# Patient Record
Sex: FEMALE | Race: WHITE | NOT HISPANIC OR LATINO | Employment: FULL TIME | URBAN - METROPOLITAN AREA
[De-identification: names, ages, dates, MRNs, and addresses within clinical notes are randomized per-mention and may not be internally consistent; named-entity substitution may affect disease eponyms.]

---

## 2017-01-23 RX ORDER — CITALOPRAM 40 MG/1
TABLET ORAL
Qty: 90 TABLET | Refills: 0 | Status: SHIPPED | OUTPATIENT
Start: 2017-01-23 | End: 2017-10-24 | Stop reason: SDUPTHER

## 2017-03-10 ENCOUNTER — OFFICE VISIT (OUTPATIENT)
Dept: OBSTETRICS AND GYNECOLOGY | Facility: CLINIC | Age: 57
End: 2017-03-10

## 2017-03-10 VITALS
BODY MASS INDEX: 26.4 KG/M2 | SYSTOLIC BLOOD PRESSURE: 127 MMHG | DIASTOLIC BLOOD PRESSURE: 82 MMHG | HEIGHT: 63 IN | HEART RATE: 65 BPM | WEIGHT: 149 LBS

## 2017-03-10 DIAGNOSIS — N76.0 ACUTE VAGINITIS: ICD-10-CM

## 2017-03-10 DIAGNOSIS — Z12.11 COLON CANCER SCREENING: ICD-10-CM

## 2017-03-10 DIAGNOSIS — Z01.419 ENCOUNTER FOR GYNECOLOGICAL EXAMINATION WITHOUT ABNORMAL FINDING: Primary | ICD-10-CM

## 2017-03-10 LAB — HEMOCCULT STL QL IA: NEGATIVE

## 2017-03-10 PROCEDURE — 99396 PREV VISIT EST AGE 40-64: CPT | Performed by: OBSTETRICS & GYNECOLOGY

## 2017-03-10 PROCEDURE — 82274 ASSAY TEST FOR BLOOD FECAL: CPT | Performed by: OBSTETRICS & GYNECOLOGY

## 2017-03-10 NOTE — PROGRESS NOTES
"Subjective   Estephania Gimenez is a 56 y.o. female annual exam. Had spotting and discharge 1 day last month after a bike ride.    History of Present Illness    Patient is a 56 y.o. for annual exam. Patient with spotting about a month ago after a bike ride. Patient without further bleeding. Patient did have some discharge as well.     Health Maintenance   Topic Date Due   • TDAP/TD VACCINES (1 - Tdap) 07/11/1977   • HEPATITIS C SCREENING  02/25/2016   • INFLUENZA VACCINE  08/01/2016   • DXA SCAN  03/10/2017   • LIPID PANEL  09/09/2017   • MAMMOGRAM  09/01/2018   • PAP SMEAR  08/01/2019   • COLONOSCOPY  03/10/2024       The following portions of the patient's history were reviewed and updated as appropriate: allergies, current medications, past family history, past medical history, past social history, past surgical history and problem list.    Review of Systems   Genitourinary: Positive for vaginal bleeding.   All other systems reviewed and are negative.      Vitals:    03/10/17 1355   BP: 127/82   Pulse: 65   Weight: 149 lb (67.6 kg)   Height: 63\" (160 cm)       Objective   Physical Exam   Constitutional: She is oriented to person, place, and time. She appears well-developed and well-nourished.   HENT:   Head: Normocephalic and atraumatic.   Eyes: Conjunctivae and EOM are normal.   Neck: Normal range of motion. Neck supple. No thyromegaly present.   Cardiovascular: Normal rate, regular rhythm and normal heart sounds.    Pulmonary/Chest: Effort normal and breath sounds normal. Right breast exhibits no mass, no nipple discharge and no tenderness. Left breast exhibits no mass, no nipple discharge and no tenderness.   Abdominal: Soft. Bowel sounds are normal. There is no hepatosplenomegaly. There is no tenderness. No hernia.   Genitourinary: Rectum normal, vagina normal and uterus normal. Rectal exam shows no external hemorrhoid. There is no rash, tenderness or lesion on the right labia. There is no rash, tenderness or " lesion on the left labia. Uterus is not enlarged and not tender. Cervix exhibits no discharge. Right adnexum displays no mass and no tenderness. Left adnexum displays no mass and no tenderness. No tenderness in the vagina. No vaginal discharge found.   Musculoskeletal: Normal range of motion. She exhibits no edema.   Lymphadenopathy:        Right: No inguinal adenopathy present.        Left: No inguinal adenopathy present.   Neurological: She is alert and oriented to person, place, and time.   Skin: Skin is warm and dry. No rash noted.   Psychiatric: She has a normal mood and affect.   Vitals reviewed.        Assessment/Plan   Estephania was seen today for gynecologic exam.    Diagnoses and all orders for this visit:    Encounter for gynecological examination without abnormal finding    Acute vaginitis  -     Nuab VG+    check ultrasound for postmenopausal bleeding.             Return in about 1 year (around 3/10/2018).

## 2017-03-14 LAB
A VAGINAE DNA VAG QL NAA+PROBE: NORMAL SCORE
BVAB2 DNA VAG QL NAA+PROBE: NORMAL SCORE
C ALBICANS DNA VAG QL NAA+PROBE: NEGATIVE
C GLABRATA DNA VAG QL NAA+PROBE: NEGATIVE
C TRACH RRNA SPEC QL NAA+PROBE: NEGATIVE
MEGA1 DNA VAG QL NAA+PROBE: NORMAL SCORE
N GONORRHOEA RRNA SPEC QL NAA+PROBE: NEGATIVE
T VAGINALIS RRNA SPEC QL NAA+PROBE: NEGATIVE

## 2017-03-22 DIAGNOSIS — E78.5 HYPERLIPIDEMIA, UNSPECIFIED HYPERLIPIDEMIA TYPE: ICD-10-CM

## 2017-03-22 DIAGNOSIS — R73.01 ELEVATED FASTING GLUCOSE: ICD-10-CM

## 2017-03-22 DIAGNOSIS — Z11.59 NEED FOR HEPATITIS C SCREENING TEST: Primary | ICD-10-CM

## 2017-03-24 ENCOUNTER — PROCEDURE VISIT (OUTPATIENT)
Dept: OBSTETRICS AND GYNECOLOGY | Facility: CLINIC | Age: 57
End: 2017-03-24

## 2017-03-24 DIAGNOSIS — N95.0 PMB (POSTMENOPAUSAL BLEEDING): Primary | ICD-10-CM

## 2017-03-24 DIAGNOSIS — D25.9 UTERINE LEIOMYOMA, UNSPECIFIED LOCATION: ICD-10-CM

## 2017-03-24 PROCEDURE — 76830 TRANSVAGINAL US NON-OB: CPT | Performed by: OBSTETRICS & GYNECOLOGY

## 2017-04-12 LAB
ALBUMIN SERPL-MCNC: 4.4 G/DL (ref 3.5–5.2)
ALBUMIN/GLOB SERPL: 1.4 G/DL
ALP SERPL-CCNC: 80 U/L (ref 39–117)
ALT SERPL-CCNC: 19 U/L (ref 1–33)
AST SERPL-CCNC: 19 U/L (ref 1–32)
BASOPHILS # BLD AUTO: 0.02 10*3/MM3 (ref 0–0.2)
BASOPHILS NFR BLD AUTO: 0.6 % (ref 0–1.5)
BILIRUB SERPL-MCNC: 0.3 MG/DL (ref 0.1–1.2)
BUN SERPL-MCNC: 17 MG/DL (ref 6–20)
BUN/CREAT SERPL: 24.3 (ref 7–25)
CALCIUM SERPL-MCNC: 9.9 MG/DL (ref 8.6–10.5)
CHLORIDE SERPL-SCNC: 102 MMOL/L (ref 98–107)
CHOLEST SERPL-MCNC: 166 MG/DL (ref 100–199)
CO2 SERPL-SCNC: 28.1 MMOL/L (ref 22–29)
CREAT SERPL-MCNC: 0.7 MG/DL (ref 0.57–1)
EOSINOPHIL # BLD AUTO: 0.11 10*3/MM3 (ref 0–0.7)
EOSINOPHIL NFR BLD AUTO: 3.2 % (ref 0.3–6.2)
ERYTHROCYTE [DISTWIDTH] IN BLOOD BY AUTOMATED COUNT: 13.1 % (ref 11.7–13)
GLOBULIN SER CALC-MCNC: 3.2 GM/DL
GLUCOSE SERPL-MCNC: 94 MG/DL (ref 65–99)
HBA1C MFR BLD: 5.6 % (ref 4.8–5.6)
HCT VFR BLD AUTO: 39.6 % (ref 35.6–45.5)
HDL SERPL-SCNC: 39.5 UMOL/L
HDLC SERPL-MCNC: 48 MG/DL
HGB BLD-MCNC: 12.8 G/DL (ref 11.9–15.5)
IMM GRANULOCYTES # BLD: 0 10*3/MM3 (ref 0–0.03)
IMM GRANULOCYTES NFR BLD: 0 % (ref 0–0.5)
LDL SERPL QN: 21 NM
LDL SERPL-SCNC: 1230 NMOL/L
LDL SMALL SERPL-SCNC: 705 NMOL/L
LDLC SERPL CALC-MCNC: 91 MG/DL (ref 0–99)
LYMPHOCYTES # BLD AUTO: 1.53 10*3/MM3 (ref 0.9–4.8)
LYMPHOCYTES NFR BLD AUTO: 44.7 % (ref 19.6–45.3)
MCH RBC QN AUTO: 31.5 PG (ref 26.9–32)
MCHC RBC AUTO-ENTMCNC: 32.3 G/DL (ref 32.4–36.3)
MCV RBC AUTO: 97.5 FL (ref 80.5–98.2)
MONOCYTES # BLD AUTO: 0.31 10*3/MM3 (ref 0.2–1.2)
MONOCYTES NFR BLD AUTO: 9.1 % (ref 5–12)
NEUTROPHILS # BLD AUTO: 1.45 10*3/MM3 (ref 1.9–8.1)
NEUTROPHILS NFR BLD AUTO: 42.4 % (ref 42.7–76)
PLATELET # BLD AUTO: 286 10*3/MM3 (ref 140–500)
POTASSIUM SERPL-SCNC: 4.5 MMOL/L (ref 3.5–5.2)
PROT SERPL-MCNC: 7.6 G/DL (ref 6–8.5)
RBC # BLD AUTO: 4.06 10*6/MM3 (ref 3.9–5.2)
SODIUM SERPL-SCNC: 143 MMOL/L (ref 136–145)
TRIGL SERPL-MCNC: 133 MG/DL (ref 0–149)
WBC # BLD AUTO: 3.42 10*3/MM3 (ref 4.5–10.7)

## 2017-04-18 ENCOUNTER — OFFICE VISIT (OUTPATIENT)
Dept: FAMILY MEDICINE CLINIC | Facility: CLINIC | Age: 57
End: 2017-04-18

## 2017-04-18 VITALS
DIASTOLIC BLOOD PRESSURE: 70 MMHG | OXYGEN SATURATION: 98 % | SYSTOLIC BLOOD PRESSURE: 120 MMHG | TEMPERATURE: 97.8 F | BODY MASS INDEX: 27.11 KG/M2 | HEIGHT: 63 IN | HEART RATE: 64 BPM | WEIGHT: 153 LBS

## 2017-04-18 DIAGNOSIS — J30.89 ENVIRONMENTAL AND SEASONAL ALLERGIES: Primary | ICD-10-CM

## 2017-04-18 DIAGNOSIS — E78.5 HYPERLIPIDEMIA, UNSPECIFIED HYPERLIPIDEMIA TYPE: ICD-10-CM

## 2017-04-18 DIAGNOSIS — F41.9 ANXIETY: ICD-10-CM

## 2017-04-18 PROCEDURE — 99213 OFFICE O/P EST LOW 20 MIN: CPT | Performed by: INTERNAL MEDICINE

## 2017-04-18 RX ORDER — MONTELUKAST SODIUM 10 MG/1
10 TABLET ORAL NIGHTLY
Qty: 30 TABLET | Refills: 5 | Status: SHIPPED | OUTPATIENT
Start: 2017-04-18 | End: 2017-11-08

## 2017-04-18 NOTE — PROGRESS NOTES
Subjective   Estephania Gimenez is a 56 y.o. female. Patient is here today for follow-up on her hyperlipidemia, anxiety.  She generally is been feeling fine but has been having a lot of problem with Springtime allergies.  Normally they last just for several weeks to a month and then resolve they've been especially bad this year and she's had a lot of eye tearing and symptoms.  She has been using over-the-counter any histamine's but is not added and in no spray.    Chief Complaint   Patient presents with   • Hyperlipidemia     FOLLOW UP LABS          Vitals:    04/18/17 1033   BP: 120/70   Pulse: 64   Temp: 97.8 °F (36.6 °C)   SpO2: 98%     The following portions of the patient's history were reviewed and updated as appropriate: allergies, current medications, past family history, past medical history, past social history, past surgical history and problem list.    Past Medical History:   Diagnosis Date   • Cellulitis       No Known Allergies   Social History     Social History   • Marital status:      Spouse name: N/A   • Number of children: N/A   • Years of education: N/A     Occupational History   • Not on file.     Social History Main Topics   • Smoking status: Former Smoker   • Smokeless tobacco: Never Used   • Alcohol use Yes   • Drug use: No   • Sexual activity: Yes     Partners: Male     Other Topics Concern   • Not on file     Social History Narrative        Current Outpatient Prescriptions:   •  aspirin 81 MG tablet, Take  by mouth., Disp: , Rfl:   •  Calcium Citrate-Vitamin D (CALCIUM CITRATE + PO), Take  by mouth., Disp: , Rfl:   •  citalopram (CeleXA) 40 MG tablet, TAKE 1 TABLET BY MOUTH DAILY, Disp: 90 tablet, Rfl: 0  •  MULTIPLE VITAMINS-CALCIUM PO, Take  by mouth., Disp: , Rfl:   •  pravastatin (PRAVACHOL) 40 MG tablet, TAKE 1 TABLET BY MOUTH EVERY NIGHT AT BEDTIME, Disp: 90 tablet, Rfl: 1  •  montelukast (SINGULAIR) 10 MG tablet, Take 1 tablet by mouth Every Night., Disp: 30 tablet, Rfl: 5      Objective     History of Present Illness     Review of Systems   Constitutional: Negative.    HENT: Positive for congestion, rhinorrhea and sneezing.    Eyes: Positive for itching.   Respiratory: Negative.    Cardiovascular: Negative.    Gastrointestinal: Negative.    Genitourinary: Negative.    Musculoskeletal: Negative.    Neurological: Negative.    Psychiatric/Behavioral: Negative.        Physical Exam   Constitutional: She appears well-developed and well-nourished.   Pleasant, cooperative and in no distress with blood pressure under good control at 120/80   HENT:   Head: Normocephalic and atraumatic.   Eyes: Conjunctivae are normal.   Neck: Normal range of motion. Neck supple.   Cardiovascular: Normal rate, regular rhythm and normal heart sounds.    Pulmonary/Chest: Effort normal and breath sounds normal. No respiratory distress. She has no wheezes. She has no rales.   Musculoskeletal: Normal range of motion. She exhibits no edema.   Neurological: She is alert.   Skin: Skin is warm and dry.   Psychiatric: She has a normal mood and affect. Her behavior is normal.   Nursing note and vitals reviewed.      ASSESSMENT  overall the patient seems stable.  Blood pressure and heart and lungs are fine.  She's having symptoms of springtime allergies.  Her CBC is generally normal aside from a slightly low white cell count.  CMP was completely normal and hemoglobin A1c was normal as well.  Her lipid panel is stable with the LDL cholesterol in the moderate range.     Problem List Items Addressed This Visit        Cardiovascular and Mediastinum    Hyperlipidemia       Other    Anxiety    Environmental and seasonal allergies - Primary          PLAN  the patient will continue with an over-the-counter any histamine.  She can also add in one of the steroids nasal sprays if she wishes.  I'm going to get her some Singulair 10 mg at night to try and see if that helps.  She'll continue her other medicines and a plan on rechecking  her in 6 months with a CMP, NMR lipid panel, hemoglobin A1c and TSH because of fatigue      There are no Patient Instructions on file for this visit.  Return in about 6 months (around 10/18/2017) for with labs.

## 2017-05-15 RX ORDER — CITALOPRAM 40 MG/1
TABLET ORAL
Qty: 90 TABLET | Refills: 0 | Status: SHIPPED | OUTPATIENT
Start: 2017-05-15 | End: 2017-11-08 | Stop reason: SDUPTHER

## 2017-07-18 RX ORDER — PRAVASTATIN SODIUM 40 MG
TABLET ORAL
Qty: 90 TABLET | Refills: 3 | Status: SHIPPED | OUTPATIENT
Start: 2017-07-18 | End: 2017-10-24 | Stop reason: SDUPTHER

## 2017-08-14 RX ORDER — CITALOPRAM 40 MG/1
TABLET ORAL
Qty: 90 TABLET | Refills: 0 | Status: SHIPPED | OUTPATIENT
Start: 2017-08-14 | End: 2017-11-08 | Stop reason: SDUPTHER

## 2017-10-09 DIAGNOSIS — R73.01 ELEVATED FASTING GLUCOSE: ICD-10-CM

## 2017-10-09 DIAGNOSIS — R53.83 FATIGUE, UNSPECIFIED TYPE: ICD-10-CM

## 2017-10-09 DIAGNOSIS — E78.5 HYPERLIPIDEMIA, UNSPECIFIED HYPERLIPIDEMIA TYPE: Primary | ICD-10-CM

## 2017-10-24 RX ORDER — CITALOPRAM 40 MG/1
40 TABLET ORAL DAILY
Qty: 90 TABLET | Refills: 0 | Status: SHIPPED | OUTPATIENT
Start: 2017-10-24 | End: 2017-12-17 | Stop reason: SDUPTHER

## 2017-10-24 RX ORDER — PRAVASTATIN SODIUM 40 MG
40 TABLET ORAL NIGHTLY
Qty: 90 TABLET | Refills: 0 | Status: SHIPPED | OUTPATIENT
Start: 2017-10-24 | End: 2017-12-17 | Stop reason: SDUPTHER

## 2017-11-01 LAB
ALBUMIN SERPL-MCNC: 4.7 G/DL (ref 3.5–5.2)
ALBUMIN/GLOB SERPL: 1.6 G/DL
ALP SERPL-CCNC: 83 U/L (ref 39–117)
ALT SERPL-CCNC: 17 U/L (ref 1–33)
AST SERPL-CCNC: 17 U/L (ref 1–32)
BILIRUB SERPL-MCNC: 0.6 MG/DL (ref 0.1–1.2)
BUN SERPL-MCNC: 20 MG/DL (ref 6–20)
BUN/CREAT SERPL: 27 (ref 7–25)
CALCIUM SERPL-MCNC: 9.9 MG/DL (ref 8.6–10.5)
CHLORIDE SERPL-SCNC: 99 MMOL/L (ref 98–107)
CHOLEST SERPL-MCNC: 155 MG/DL (ref 100–199)
CO2 SERPL-SCNC: 27.8 MMOL/L (ref 22–29)
CREAT SERPL-MCNC: 0.74 MG/DL (ref 0.57–1)
GFR SERPLBLD CREATININE-BSD FMLA CKD-EPI: 80 ML/MIN/1.73
GFR SERPLBLD CREATININE-BSD FMLA CKD-EPI: 97 ML/MIN/1.73
GLOBULIN SER CALC-MCNC: 2.9 GM/DL
GLUCOSE SERPL-MCNC: 94 MG/DL (ref 65–99)
HBA1C MFR BLD: 5.5 % (ref 4.8–5.6)
HDL SERPL-SCNC: 39.5 UMOL/L
HDLC SERPL-MCNC: 48 MG/DL
LDL SERPL QN: 20.8 NM
LDL SERPL-SCNC: 919 NMOL/L
LDL SMALL SERPL-SCNC: 371 NMOL/L
LDLC SERPL CALC-MCNC: 83 MG/DL (ref 0–99)
POTASSIUM SERPL-SCNC: 4.5 MMOL/L (ref 3.5–5.2)
PROT SERPL-MCNC: 7.6 G/DL (ref 6–8.5)
SODIUM SERPL-SCNC: 142 MMOL/L (ref 136–145)
TRIGL SERPL-MCNC: 119 MG/DL (ref 0–149)
TSH SERPL DL<=0.005 MIU/L-ACNC: 3.99 MIU/ML (ref 0.27–4.2)

## 2017-11-08 ENCOUNTER — OFFICE VISIT (OUTPATIENT)
Dept: FAMILY MEDICINE CLINIC | Facility: CLINIC | Age: 57
End: 2017-11-08

## 2017-11-08 VITALS
WEIGHT: 153 LBS | TEMPERATURE: 98.3 F | HEIGHT: 63 IN | BODY MASS INDEX: 27.11 KG/M2 | SYSTOLIC BLOOD PRESSURE: 110 MMHG | OXYGEN SATURATION: 98 % | DIASTOLIC BLOOD PRESSURE: 80 MMHG | HEART RATE: 65 BPM

## 2017-11-08 DIAGNOSIS — F41.9 ANXIETY: ICD-10-CM

## 2017-11-08 DIAGNOSIS — E78.5 HYPERLIPIDEMIA, UNSPECIFIED HYPERLIPIDEMIA TYPE: Primary | ICD-10-CM

## 2017-11-08 PROCEDURE — 99213 OFFICE O/P EST LOW 20 MIN: CPT | Performed by: INTERNAL MEDICINE

## 2017-11-08 NOTE — PROGRESS NOTES
Subjective   Estephania Gimenez is a 57 y.o. female. Patient is here today for follow-up on her anxiety and hyperlipidemia.  She generally is been feeling fine and is had no chest pain, shortness of breath, edema or myalgias.  She has not gotten a flu shot yet but we'll get one at the drugstore.    Chief Complaint   Patient presents with   • Hyperlipidemia     ANXIETY, ALLERGIES - FOLLOW UP LABS          Vitals:    11/08/17 0846   BP: 110/80   Pulse: 65   Temp: 98.3 °F (36.8 °C)   SpO2: 98%     The following portions of the patient's history were reviewed and updated as appropriate: allergies, current medications, past family history, past medical history, past social history, past surgical history and problem list.    Past Medical History:   Diagnosis Date   • Cellulitis       No Known Allergies   Social History     Social History   • Marital status:      Spouse name: N/A   • Number of children: N/A   • Years of education: N/A     Occupational History   • Not on file.     Social History Main Topics   • Smoking status: Former Smoker   • Smokeless tobacco: Never Used   • Alcohol use Yes   • Drug use: No   • Sexual activity: Yes     Partners: Male     Other Topics Concern   • Not on file     Social History Narrative        Current Outpatient Prescriptions:   •  aspirin 81 MG tablet, Take  by mouth., Disp: , Rfl:   •  Calcium Citrate-Vitamin D (CALCIUM CITRATE + PO), Take  by mouth., Disp: , Rfl:   •  citalopram (CeleXA) 40 MG tablet, Take 1 tablet by mouth Daily., Disp: 90 tablet, Rfl: 0  •  MULTIPLE VITAMINS-CALCIUM PO, Take  by mouth., Disp: , Rfl:   •  pravastatin (PRAVACHOL) 40 MG tablet, Take 1 tablet by mouth Every Night., Disp: 90 tablet, Rfl: 0     Objective     History of Present Illness     Review of Systems   Constitutional: Negative.    HENT: Negative.    Eyes: Negative.    Respiratory: Negative.    Cardiovascular: Negative.    Gastrointestinal: Negative.    Endocrine: Negative.    Genitourinary:  Negative.    Musculoskeletal: Negative.    Neurological: Negative.    Psychiatric/Behavioral: Negative.        Physical Exam   Constitutional: She is oriented to person, place, and time. She appears well-developed and well-nourished.   Pleasant, cooperative no distress   HENT:   Head: Normocephalic and atraumatic.   Eyes: Conjunctivae are normal. Pupils are equal, round, and reactive to light. No scleral icterus.   Neck: Normal range of motion. Neck supple. No thyromegaly present.   Cardiovascular: Normal rate, regular rhythm and normal heart sounds.    Pulmonary/Chest: Effort normal and breath sounds normal. No respiratory distress. She has no wheezes. She has no rales.   Musculoskeletal: Normal range of motion. She exhibits no edema.   Neurological: She is alert and oriented to person, place, and time.   Skin: Skin is warm and dry.   Psychiatric: She has a normal mood and affect. Her behavior is normal.   Nursing note and vitals reviewed.      ASSESSMENT  CMP is essentially normal.  Hemoglobin A1c was normal.  TSH is normal and her NMR lipid panel is excellent, lower than her last visit.     Problem List Items Addressed This Visit        Cardiovascular and Mediastinum    Hyperlipidemia - Primary       Other    Anxiety          PLAN  The patient will continue current medicines.  I will recheck her in 6 months with a CBC, CMP, lipid panel and hepatitis C screen per protocol    There are no Patient Instructions on file for this visit.  Return in about 6 months (around 5/8/2018) for with labs.

## 2017-12-18 RX ORDER — PRAVASTATIN SODIUM 40 MG
TABLET ORAL
Qty: 90 TABLET | Refills: 1 | Status: SHIPPED | OUTPATIENT
Start: 2017-12-18 | End: 2018-03-01 | Stop reason: SDUPTHER

## 2017-12-18 RX ORDER — CITALOPRAM 40 MG/1
TABLET ORAL
Qty: 90 TABLET | Refills: 1 | Status: SHIPPED | OUTPATIENT
Start: 2017-12-18 | End: 2018-03-01 | Stop reason: SDUPTHER

## 2018-03-01 ENCOUNTER — TELEPHONE (OUTPATIENT)
Dept: FAMILY MEDICINE CLINIC | Facility: CLINIC | Age: 58
End: 2018-03-01

## 2018-03-01 RX ORDER — PRAVASTATIN SODIUM 40 MG
40 TABLET ORAL NIGHTLY
Qty: 90 TABLET | Refills: 1 | Status: SHIPPED | OUTPATIENT
Start: 2018-03-01 | End: 2018-05-09 | Stop reason: SDUPTHER

## 2018-03-01 RX ORDER — CITALOPRAM 40 MG/1
40 TABLET ORAL DAILY
Qty: 90 TABLET | Refills: 1 | Status: SHIPPED | OUTPATIENT
Start: 2018-03-01 | End: 2018-05-09 | Stop reason: SDUPTHER

## 2018-03-01 NOTE — TELEPHONE ENCOUNTER
SENT RX'S TO PHARMACY FOR PATIENT.     ----- Message from Gloria Isaac MA sent at 3/1/2018 11:49 AM EST -----  Contact: PT  PT NEEDS RX REFILL FOR pravastatin (PRAVACHOL) 40 MG tablet QTY 90  AND   citalopram (CeleXA) 40 MG tablet QTY 90  PT IS USING [facility] [address] [place] [address] [address] AT [facility] - [phone] [facility] [phone] FX        PT CAN BE REACHED [phone]

## 2018-04-26 DIAGNOSIS — Z11.59 NEED FOR HEPATITIS C SCREENING TEST: Primary | ICD-10-CM

## 2018-04-26 DIAGNOSIS — E78.5 HYPERLIPIDEMIA, UNSPECIFIED HYPERLIPIDEMIA TYPE: ICD-10-CM

## 2018-05-03 LAB
ALBUMIN SERPL-MCNC: 4.7 G/DL (ref 3.5–5.2)
ALBUMIN/GLOB SERPL: 1.6 G/DL
ALP SERPL-CCNC: 86 U/L (ref 39–117)
ALT SERPL-CCNC: 18 U/L (ref 1–33)
AST SERPL-CCNC: 19 U/L (ref 1–32)
BASOPHILS # BLD AUTO: 0.03 10*3/MM3 (ref 0–0.2)
BASOPHILS NFR BLD AUTO: 0.7 % (ref 0–1.5)
BILIRUB SERPL-MCNC: 0.7 MG/DL (ref 0.1–1.2)
BUN SERPL-MCNC: 20 MG/DL (ref 6–20)
BUN/CREAT SERPL: 26 (ref 7–25)
CALCIUM SERPL-MCNC: 9.7 MG/DL (ref 8.6–10.5)
CHLORIDE SERPL-SCNC: 100 MMOL/L (ref 98–107)
CHOLEST SERPL-MCNC: 171 MG/DL (ref 0–200)
CO2 SERPL-SCNC: 29.6 MMOL/L (ref 22–29)
CREAT SERPL-MCNC: 0.77 MG/DL (ref 0.57–1)
EOSINOPHIL # BLD AUTO: 0.25 10*3/MM3 (ref 0–0.7)
EOSINOPHIL NFR BLD AUTO: 5.9 % (ref 0.3–6.2)
ERYTHROCYTE [DISTWIDTH] IN BLOOD BY AUTOMATED COUNT: 13.7 % (ref 11.7–13)
GFR SERPLBLD CREATININE-BSD FMLA CKD-EPI: 77 ML/MIN/1.73
GFR SERPLBLD CREATININE-BSD FMLA CKD-EPI: 93 ML/MIN/1.73
GLOBULIN SER CALC-MCNC: 2.9 GM/DL
GLUCOSE SERPL-MCNC: 101 MG/DL (ref 65–99)
HCT VFR BLD AUTO: 40.8 % (ref 35.6–45.5)
HCV AB S/CO SERPL IA: <0.1 S/CO RATIO (ref 0–0.9)
HCV AB SERPL QL IA: NORMAL
HDLC SERPL-MCNC: 44 MG/DL (ref 40–60)
HGB BLD-MCNC: 12.9 G/DL (ref 11.9–15.5)
IMM GRANULOCYTES # BLD: 0 10*3/MM3 (ref 0–0.03)
IMM GRANULOCYTES NFR BLD: 0 % (ref 0–0.5)
LDLC SERPL CALC-MCNC: 100 MG/DL (ref 0–100)
LDLC/HDLC SERPL: 2.28 {RATIO}
LYMPHOCYTES # BLD AUTO: 1.49 10*3/MM3 (ref 0.9–4.8)
LYMPHOCYTES NFR BLD AUTO: 35.2 % (ref 19.6–45.3)
MCH RBC QN AUTO: 31.5 PG (ref 26.9–32)
MCHC RBC AUTO-ENTMCNC: 31.6 G/DL (ref 32.4–36.3)
MCV RBC AUTO: 99.5 FL (ref 80.5–98.2)
MONOCYTES # BLD AUTO: 0.6 10*3/MM3 (ref 0.2–1.2)
MONOCYTES NFR BLD AUTO: 14.2 % (ref 5–12)
NEUTROPHILS # BLD AUTO: 1.86 10*3/MM3 (ref 1.9–8.1)
NEUTROPHILS NFR BLD AUTO: 44 % (ref 42.7–76)
PLATELET # BLD AUTO: 296 10*3/MM3 (ref 140–500)
POTASSIUM SERPL-SCNC: 4.5 MMOL/L (ref 3.5–5.2)
PROT SERPL-MCNC: 7.6 G/DL (ref 6–8.5)
RBC # BLD AUTO: 4.1 10*6/MM3 (ref 3.9–5.2)
SODIUM SERPL-SCNC: 141 MMOL/L (ref 136–145)
TRIGL SERPL-MCNC: 134 MG/DL (ref 0–150)
VLDLC SERPL CALC-MCNC: 26.8 MG/DL (ref 5–40)
WBC # BLD AUTO: 4.23 10*3/MM3 (ref 4.5–10.7)

## 2018-05-08 RX ORDER — MONTELUKAST SODIUM 10 MG/1
10 TABLET ORAL NIGHTLY
Qty: 30 TABLET | Refills: 0 | Status: SHIPPED | OUTPATIENT
Start: 2018-05-08 | End: 2018-05-09 | Stop reason: SDUPTHER

## 2018-05-09 ENCOUNTER — OFFICE VISIT (OUTPATIENT)
Dept: FAMILY MEDICINE CLINIC | Facility: CLINIC | Age: 58
End: 2018-05-09

## 2018-05-09 VITALS
HEART RATE: 77 BPM | OXYGEN SATURATION: 98 % | WEIGHT: 153.4 LBS | BODY MASS INDEX: 27.18 KG/M2 | SYSTOLIC BLOOD PRESSURE: 116 MMHG | DIASTOLIC BLOOD PRESSURE: 76 MMHG | TEMPERATURE: 98.1 F | HEIGHT: 63 IN

## 2018-05-09 DIAGNOSIS — F41.9 ANXIETY: ICD-10-CM

## 2018-05-09 DIAGNOSIS — E78.5 HYPERLIPIDEMIA, UNSPECIFIED HYPERLIPIDEMIA TYPE: Primary | ICD-10-CM

## 2018-05-09 DIAGNOSIS — Z78.0 POST-MENOPAUSAL: ICD-10-CM

## 2018-05-09 DIAGNOSIS — J30.89 ENVIRONMENTAL AND SEASONAL ALLERGIES: ICD-10-CM

## 2018-05-09 PROCEDURE — 99213 OFFICE O/P EST LOW 20 MIN: CPT | Performed by: INTERNAL MEDICINE

## 2018-05-09 RX ORDER — PRAVASTATIN SODIUM 40 MG
40 TABLET ORAL NIGHTLY
Qty: 90 TABLET | Refills: 1 | Status: SHIPPED | OUTPATIENT
Start: 2018-05-09 | End: 2018-08-19 | Stop reason: SDUPTHER

## 2018-05-09 RX ORDER — MONTELUKAST SODIUM 10 MG/1
10 TABLET ORAL NIGHTLY
Qty: 30 TABLET | Refills: 5 | Status: SHIPPED | OUTPATIENT
Start: 2018-05-09 | End: 2018-11-13

## 2018-05-09 RX ORDER — CITALOPRAM 40 MG/1
40 TABLET ORAL DAILY
Qty: 90 TABLET | Refills: 1 | Status: SHIPPED | OUTPATIENT
Start: 2018-05-09 | End: 2018-07-30 | Stop reason: SDUPTHER

## 2018-05-09 NOTE — PROGRESS NOTES
Subjective   Estephania Gimenez is a 57 y.o. female. Patient is here today for follow-up on her hyperlipidemia and allergies and anxiety.  She has had a flare of allergies which primarily affected her eyes.  She did well with Singulair last year.  Otherwise she's felt well and is had no chest pain, shortness of breath, edema or myalgias.  Her anxiety is well controlled on Celexa.    Chief Complaint   Patient presents with   • Hyperlipidemia     ANXIETY- FOLLOW UP LABS AND MEDICINES          Vitals:    05/09/18 0855   BP: 116/76   Pulse: 77   Temp: 98.1 °F (36.7 °C)   SpO2: 98%     The following portions of the patient's history were reviewed and updated as appropriate: allergies, current medications, past family history, past medical history, past social history, past surgical history and problem list.    Past Medical History:   Diagnosis Date   • Cellulitis       No Known Allergies   Social History     Social History   • Marital status:      Spouse name: N/A   • Number of children: N/A   • Years of education: N/A     Occupational History   • Not on file.     Social History Main Topics   • Smoking status: Former Smoker   • Smokeless tobacco: Never Used   • Alcohol use Yes   • Drug use: No   • Sexual activity: Yes     Partners: Male     Other Topics Concern   • Not on file     Social History Narrative   • No narrative on file        Current Outpatient Prescriptions:   •  aspirin 81 MG tablet, Take  by mouth., Disp: , Rfl:   •  Calcium Citrate-Vitamin D (CALCIUM CITRATE + PO), Take  by mouth., Disp: , Rfl:   •  citalopram (CeleXA) 40 MG tablet, Take 1 tablet by mouth Daily., Disp: 90 tablet, Rfl: 1  •  montelukast (SINGULAIR) 10 MG tablet, Take 1 tablet by mouth Every Night., Disp: 30 tablet, Rfl: 5  •  MULTIPLE VITAMINS-CALCIUM PO, Take  by mouth., Disp: , Rfl:   •  pravastatin (PRAVACHOL) 40 MG tablet, Take 1 tablet by mouth Every Night., Disp: 90 tablet, Rfl: 1     Objective     History of Present Illness      Review of Systems   Constitutional: Negative.    HENT: Negative.    Eyes: Negative.    Respiratory: Negative.    Cardiovascular: Negative.    Gastrointestinal: Negative.    Endocrine: Negative.    Genitourinary: Negative.    Musculoskeletal: Negative.    Skin: Negative.    Neurological: Negative.    Psychiatric/Behavioral: Negative.        Physical Exam   Constitutional: She is oriented to person, place, and time. She appears well-developed and well-nourished.   Pleasant, cooperative no distress blood pressure 120/80   HENT:   Head: Normocephalic and atraumatic.   Eyes: Conjunctivae are normal. Pupils are equal, round, and reactive to light. No scleral icterus.   Neck: Normal range of motion. Neck supple.   Cardiovascular: Normal rate, regular rhythm and normal heart sounds.    Pulmonary/Chest: Effort normal and breath sounds normal. No respiratory distress. She has no wheezes. She has no rales.   Musculoskeletal: Normal range of motion. She exhibits no edema.   Neurological: She is alert and oriented to person, place, and time.   Skin: Skin is warm and dry.   Psychiatric: She has a normal mood and affect. Her behavior is normal.   Nursing note and vitals reviewed.      ASSESSMENT  CBC was essentially normal.  CMP had an elevated sugar of 101 and otherwise was normal.  Lipid panel was all under excellent control and hepatitis C screen was negative.  #1-hyperlipidemia, adequate control on medication #2-environmental allergies, seasonal  #3-anxiety controlled on medication       Problem List Items Addressed This Visit        Cardiovascular and Mediastinum    Hyperlipidemia - Primary    Relevant Medications    pravastatin (PRAVACHOL) 40 MG tablet       Other    Environmental and seasonal allergies      Other Visit Diagnoses     Post-menopausal        Relevant Orders    DEXA Bone Density Axial          PLAN  I refilled the patient's Singulair and she can also use an over-the-counter any histamine.  She will continue  her other medicines as now.  She is due for a bone density test since she is postmenopausal and I ordered that.  Plan on rechecking her in 6 months with a CMP, NMR lipid panel, TSH and hemoglobin A1c    There are no Patient Instructions on file for this visit.  Return in about 6 months (around 11/9/2018) for with labs.

## 2018-05-14 ENCOUNTER — HOSPITAL ENCOUNTER (OUTPATIENT)
Dept: BONE DENSITY | Facility: HOSPITAL | Age: 58
Discharge: HOME | End: 2018-05-14
Admitting: INTERNAL MEDICINE

## 2018-05-14 DIAGNOSIS — Z78.0 POST-MENOPAUSAL: ICD-10-CM

## 2018-05-14 PROCEDURE — 77080 DXA BONE DENSITY AXIAL: CPT

## 2018-05-17 ENCOUNTER — OFFICE VISIT (OUTPATIENT)
Dept: OBSTETRICS AND GYNECOLOGY | Facility: CLINIC | Age: 58
End: 2018-05-17

## 2018-05-17 VITALS
DIASTOLIC BLOOD PRESSURE: 81 MMHG | WEIGHT: 151 LBS | HEART RATE: 71 BPM | BODY MASS INDEX: 26.75 KG/M2 | SYSTOLIC BLOOD PRESSURE: 117 MMHG | HEIGHT: 63 IN

## 2018-05-17 DIAGNOSIS — Z01.419 WELL WOMAN EXAM WITH ROUTINE GYNECOLOGICAL EXAM: Primary | ICD-10-CM

## 2018-05-17 DIAGNOSIS — N64.59 ABNORMAL BREAST EXAM: ICD-10-CM

## 2018-05-17 PROCEDURE — 99396 PREV VISIT EST AGE 40-64: CPT | Performed by: OBSTETRICS & GYNECOLOGY

## 2018-05-17 NOTE — PROGRESS NOTES
Subjective   Estephania Gimenez is a 57 y.o. female   Chief Complaint   Patient presents with   • Gynecologic Exam     last pap 3/9/16      History of Present Illness  Estephania Gimenez is a 57 y.o.  who presents for an annual examination   Had episode of PMB last year with normal ultrasound, thin endometrial stripe     Pap history:  Last pap: 2016 NIL, HPV negative  Prior abnormal paps: yes, over 20 years ago  DXA:  yes, May 2018 per PCP showing osteopenia  Colonoscopy:  yes, UTD  Mammogram: due in   STDs  Sexually active: yes  History of STDs: no  Menopause:  Age: 50  Bleeding since? Yes, but none since ultrasound last year with thin endometrial stripe  Vasomotor symptoms: no  HRT: no  Dyspareunia: yes, has used Lubricants but not moisturizers.  Declines vaginal estrogen        BRCA screening:  Is patient's family or personal history significant for any of the following? no  For non-Ashkenazi Zoroastrianism women:   Two first-degree relatives with breast cancer, one of whom was diagnosed at age 50 or younger   A combination of three or more first or second-degree relatives with breast cancer regardless of age at diagnosis   A combination of both breast and ovarian cancer among first and second-degree relatives   A first-degree relative with bilateral breast cancer   A combination of two or more first or second degree relatives with ovarian cancer, regardless of age at diagnosis   A first or second-degree relative with both breast and ovarian cancer at any age   History of breast cancer in a male relative   For women of Ashkenazi Zoroastrianism descent:   Any first-degree relative (or two second degree relatives on the same side of the family) with breast or ovarian cancer   Recommendations from the [place] Preventive Services Task Force on who should be offered genetic testing for BRCA mutations*     Past Medical History:   Diagnosis Date   • Cellulitis      Past Surgical History:   Procedure Laterality Date    • COLONOSCOPY     • TUBAL ABDOMINAL LIGATION     • WISDOM TOOTH EXTRACTION       Social History   Substance Use Topics   • Smoking status: Former Smoker   • Smokeless tobacco: Never Used   • Alcohol use Yes     Family History   Problem Relation Age of Onset   • No Known Problems Mother    • No Known Problems Father    • Cancer Maternal Aunt    • Breast cancer Paternal Aunt    • Throat cancer Paternal Uncle    • Heart disease Maternal Grandmother    • Colon cancer Paternal Uncle    • Heart disease Paternal Uncle    • Heart disease Paternal Uncle    • Ovarian cancer Neg Hx    • Uterine cancer Neg Hx      Current Outpatient Prescriptions on File Prior to Visit   Medication Sig Dispense Refill   • aspirin 81 MG tablet Take  by mouth.     • Calcium Citrate-Vitamin D (CALCIUM CITRATE + PO) Take  by mouth.     • citalopram (CeleXA) 40 MG tablet Take 1 tablet by mouth Daily. 90 tablet 1   • montelukast (SINGULAIR) 10 MG tablet Take 1 tablet by mouth Every Night. 30 tablet 5   • MULTIPLE VITAMINS-CALCIUM PO Take  by mouth.     • pravastatin (PRAVACHOL) 40 MG tablet Take 1 tablet by mouth Every Night. 90 tablet 1     No current facility-administered medications on file prior to visit.      No Known Allergies     Review of Systems   Constitutional: Negative for chills, fever and unexpected weight change.   HENT: Negative for congestion, nosebleeds and sore throat.    Eyes: Negative for visual disturbance.   Respiratory: Negative for cough, chest tightness and shortness of breath.    Cardiovascular: Negative for chest pain and palpitations.   Gastrointestinal: Negative for abdominal pain, constipation, diarrhea, nausea and vomiting.   Endocrine: Negative for polyuria.   Genitourinary: Negative for difficulty urinating, dyspareunia, dysuria, frequency, genital sores, hematuria, menstrual problem, pelvic pain, urgency, vaginal bleeding, vaginal discharge and vaginal pain.   Musculoskeletal: Negative for arthralgias and joint  "swelling.   Skin: Negative for color change and rash.   Neurological: Negative for dizziness, light-headedness and headaches.   Hematological: Does not bruise/bleed easily.   Psychiatric/Behavioral: Negative for dysphoric mood. The patient is not nervous/anxious.        Objective    /81   Pulse 71   Ht 160 cm (62.99\")   Wt 68.5 kg (151 lb)   BMI 26.76 kg/m²    Physical Exam   Constitutional: She is oriented to person, place, and time. She appears well-developed and well-nourished. No distress.   HENT:   Head: Normocephalic and atraumatic.   Neck: No tracheal deviation present. No thyromegaly present.   Cardiovascular: Normal rate, regular rhythm and normal heart sounds.  Exam reveals no gallop and no friction rub.    No murmur heard.  Pulmonary/Chest: Effort normal and breath sounds normal. No respiratory distress. She has no wheezes. She has no rales. She exhibits no tenderness. Right breast exhibits no inverted nipple, no mass, no nipple discharge, no skin change and no tenderness. Left breast exhibits no inverted nipple, no mass, no nipple discharge, no skin change and no tenderness.       Abdominal: Soft. She exhibits no distension and no mass. There is no tenderness.   Genitourinary: Uterus normal. No labial fusion. There is no rash, lesion or injury on the right labia. There is no rash, lesion or injury on the left labia. Uterus is not deviated, not enlarged, not fixed and not tender. Cervix exhibits no motion tenderness, no discharge and no friability. Right adnexum displays no mass, no tenderness and no fullness. Left adnexum displays no mass, no tenderness and no fullness. No tenderness or bleeding in the vagina. No vaginal discharge found.   Musculoskeletal: Normal range of motion. She exhibits no edema or deformity.   Lymphadenopathy:     She has no cervical adenopathy.   Neurological: She is alert and oriented to person, place, and time.   Skin: Skin is warm and dry. No rash noted. She is not " diaphoretic.   Psychiatric: She has a normal mood and affect. Her behavior is normal. Judgment and thought content normal.         Assessment/Plan   Problems Addressed this Visit     None      Visit Diagnoses     Well woman exam with routine gynecological exam    -  Primary        Well woman counseling/screening:    Cervical cancer screening:    Reports remote h/o cervical dysplasia   The patient is due for a pap in 2021, discussed. Patient desires today.    Screening guidelines discussed with patient  Breast cancer screening:    Mammogram UTD, due in June. Gets at [facility]   Breast self awareness encouraged   Reviewed findings from exam, will order right diagnostic mammogram  Colonscopy:   UTD  DXA   S/p this month per PCP  Family history    does not demonstrate need for genetics referral   Healthy lifestyle counseling:   Patient was counseled on    Body mass index is 26.76 kg/m².   Vaginal dryness - discussed use of moisturizers/lubricants

## 2018-05-21 LAB
CYTOLOGIST CVX/VAG CYTO: NORMAL
CYTOLOGY CVX/VAG DOC THIN PREP: NORMAL
DX ICD CODE: NORMAL
HIV 1 & 2 AB SER-IMP: NORMAL
HPV I/H RISK 4 DNA CVX QL PROBE+SIG AMP: NEGATIVE
OTHER STN SPEC: NORMAL
PATH REPORT.FINAL DX SPEC: NORMAL
STAT OF ADQ CVX/VAG CYTO-IMP: NORMAL

## 2018-05-22 ENCOUNTER — APPOINTMENT (OUTPATIENT)
Dept: WOMENS IMAGING | Facility: HOSPITAL | Age: 58
End: 2018-05-22

## 2018-05-22 ENCOUNTER — TELEPHONE (OUTPATIENT)
Dept: OBSTETRICS AND GYNECOLOGY | Facility: CLINIC | Age: 58
End: 2018-05-22

## 2018-05-22 PROCEDURE — 76641 ULTRASOUND BREAST COMPLETE: CPT | Performed by: RADIOLOGY

## 2018-05-22 PROCEDURE — 77065 DX MAMMO INCL CAD UNI: CPT | Performed by: RADIOLOGY

## 2018-05-22 PROCEDURE — MDREVIEWSP: Performed by: RADIOLOGY

## 2018-05-22 NOTE — TELEPHONE ENCOUNTER
----- Message from Ruthie Lane MD sent at 5/21/2018  5:27 PM EDT -----  Please contact patient and let her know that her pap smear was normal and HPV testing was negative. Thanks!    5/22/18  Called patient to inform results, no answer. Left a message to return call.

## 2018-05-23 DIAGNOSIS — Z12.39 SCREENING FOR BREAST CANCER: Primary | ICD-10-CM

## 2018-06-05 ENCOUNTER — TELEPHONE (OUTPATIENT)
Dept: FAMILY MEDICINE CLINIC | Facility: CLINIC | Age: 58
End: 2018-06-05

## 2018-06-05 NOTE — TELEPHONE ENCOUNTER
CALLED AND S/W PT AND ADVISED WHAT DR. DENG SAID. PT VOICED UNDERSTANDING.     ----- Message from Shon Deng MD sent at 6/5/2018 10:28 AM EDT -----  She has some mild osteopenia but no osteoporosis.  She should take a calcium and vitamin D tablet such as Citracal her Caltrate and weightbearing exercises also good but nothing else as needed.      ----- Message -----  From: Faina Feliciano MA  Sent: 6/5/2018   9:34 AM  To: MD DR. ARLENE Grajeda,  PLEASE SEE BELOW AND ADVISE. THANK YOU.      ----- Message -----  From: Evie Tucker  Sent: 6/5/2018   9:29 AM  To: Faina Feliciano MA    WANTING RESULTS OF BONE DENSITY FROM 3 WEEKS AGO HASNT HEARD GORM ANYONE       PLEASE CALL WITH RESULTS     [phone]   PLEASE LEAVE MSG IF NO ANSWER

## 2018-06-08 RX ORDER — MONTELUKAST SODIUM 10 MG/1
10 TABLET ORAL NIGHTLY
Qty: 30 TABLET | Refills: 5 | Status: SHIPPED | OUTPATIENT
Start: 2018-06-08 | End: 2019-09-26

## 2018-07-31 RX ORDER — CITALOPRAM 40 MG/1
40 TABLET ORAL DAILY
Qty: 90 TABLET | Refills: 1 | Status: SHIPPED | OUTPATIENT
Start: 2018-07-31 | End: 2018-11-13 | Stop reason: SDUPTHER

## 2018-08-20 RX ORDER — PRAVASTATIN SODIUM 40 MG
40 TABLET ORAL NIGHTLY
Qty: 90 TABLET | Refills: 1 | Status: SHIPPED | OUTPATIENT
Start: 2018-08-20 | End: 2018-11-13 | Stop reason: SDUPTHER

## 2018-11-05 DIAGNOSIS — Z13.29 SCREENING FOR THYROID DISORDER: ICD-10-CM

## 2018-11-05 DIAGNOSIS — Z79.899 LONG TERM USE OF DRUG: Primary | ICD-10-CM

## 2018-11-05 DIAGNOSIS — Z13.1 SCREENING FOR DIABETES MELLITUS: ICD-10-CM

## 2018-11-05 DIAGNOSIS — E78.5 HYPERLIPIDEMIA, UNSPECIFIED HYPERLIPIDEMIA TYPE: ICD-10-CM

## 2018-11-09 LAB
ALBUMIN SERPL-MCNC: 4.5 G/DL (ref 3.5–5.2)
ALBUMIN/GLOB SERPL: 1.6 G/DL
ALP SERPL-CCNC: 83 U/L (ref 39–117)
ALT SERPL-CCNC: 13 U/L (ref 1–33)
AST SERPL-CCNC: 14 U/L (ref 1–32)
BILIRUB SERPL-MCNC: 0.4 MG/DL (ref 0.1–1.2)
BUN SERPL-MCNC: 16 MG/DL (ref 8–23)
BUN/CREAT SERPL: 21.3 (ref 7–25)
CALCIUM SERPL-MCNC: 9.8 MG/DL (ref 8.6–10.5)
CHLORIDE SERPL-SCNC: 102 MMOL/L (ref 98–107)
CHOLEST SERPL-MCNC: 157 MG/DL (ref 100–199)
CO2 SERPL-SCNC: 30.3 MMOL/L (ref 22–29)
CREAT SERPL-MCNC: 0.75 MG/DL (ref 0.57–1)
GLOBULIN SER CALC-MCNC: 2.9 GM/DL
GLUCOSE SERPL-MCNC: 102 MG/DL (ref 65–99)
HBA1C MFR BLD: 5.39 % (ref 4.8–5.6)
HDL SERPL-SCNC: 38.3 UMOL/L
HDLC SERPL-MCNC: 45 MG/DL
LDL SERPL QN: 20.9 NM
LDL SERPL-SCNC: 1038 NMOL/L
LDL SMALL SERPL-SCNC: 586 NMOL/L
LDLC SERPL CALC-MCNC: 88 MG/DL (ref 0–99)
POTASSIUM SERPL-SCNC: 4.5 MMOL/L (ref 3.5–5.2)
PROT SERPL-MCNC: 7.4 G/DL (ref 6–8.5)
SODIUM SERPL-SCNC: 143 MMOL/L (ref 136–145)
TRIGL SERPL-MCNC: 122 MG/DL (ref 0–149)
TSH SERPL DL<=0.005 MIU/L-ACNC: 4.09 MIU/ML (ref 0.27–4.2)

## 2018-11-13 ENCOUNTER — OFFICE VISIT (OUTPATIENT)
Dept: FAMILY MEDICINE CLINIC | Facility: CLINIC | Age: 58
End: 2018-11-13

## 2018-11-13 VITALS
WEIGHT: 155 LBS | SYSTOLIC BLOOD PRESSURE: 100 MMHG | DIASTOLIC BLOOD PRESSURE: 60 MMHG | OXYGEN SATURATION: 98 % | HEART RATE: 76 BPM | RESPIRATION RATE: 16 BRPM | HEIGHT: 63 IN | BODY MASS INDEX: 27.46 KG/M2

## 2018-11-13 DIAGNOSIS — E78.5 HYPERLIPIDEMIA, UNSPECIFIED HYPERLIPIDEMIA TYPE: Primary | ICD-10-CM

## 2018-11-13 DIAGNOSIS — Z23 NEED FOR INFLUENZA VACCINATION: ICD-10-CM

## 2018-11-13 DIAGNOSIS — F41.9 ANXIETY: ICD-10-CM

## 2018-11-13 DIAGNOSIS — R73.9 HYPERGLYCEMIA: ICD-10-CM

## 2018-11-13 PROCEDURE — 99214 OFFICE O/P EST MOD 30 MIN: CPT | Performed by: INTERNAL MEDICINE

## 2018-11-13 PROCEDURE — 90471 IMMUNIZATION ADMIN: CPT | Performed by: INTERNAL MEDICINE

## 2018-11-13 PROCEDURE — 90674 CCIIV4 VAC NO PRSV 0.5 ML IM: CPT | Performed by: INTERNAL MEDICINE

## 2018-11-13 RX ORDER — PRAVASTATIN SODIUM 40 MG
40 TABLET ORAL NIGHTLY
Qty: 90 TABLET | Refills: 3 | Status: SHIPPED | OUTPATIENT
Start: 2018-11-13 | End: 2019-01-28 | Stop reason: SDUPTHER

## 2018-11-13 RX ORDER — CITALOPRAM 40 MG/1
40 TABLET ORAL DAILY
Qty: 90 TABLET | Refills: 3 | Status: SHIPPED | OUTPATIENT
Start: 2018-11-13 | End: 2019-01-12 | Stop reason: SDUPTHER

## 2018-11-13 NOTE — PROGRESS NOTES
Subjective   Estephania Gimenez is a 58 y.o. female. Patient is here today for follow-up on her hyperlipidemia, anxiety and hyperglycemia.  She generally feels well and is had no new acute problem is had chest pain, shortness of breath, edema or myalgias..  Chief Complaint   Patient presents with   • Hyperlipidemia          Vitals:    11/13/18 1504   BP: 100/60   Pulse: 76   Resp: 16   SpO2: 98%     The following portions of the patient's history were reviewed and updated as appropriate: allergies, current medications, past family history, past medical history, past social history, past surgical history and problem list.    Past Medical History:   Diagnosis Date   • Cellulitis       No Known Allergies   Social History     Socioeconomic History   • Marital status:      Spouse name: Not on file   • Number of children: Not on file   • Years of education: Not on file   • Highest education level: Not on file   Social Needs   • Financial resource strain: Not on file   • Food insecurity - worry: Not on file   • Food insecurity - inability: Not on file   • Transportation needs - medical: Not on file   • Transportation needs - non-medical: Not on file   Occupational History   • Not on file   Tobacco Use   • Smoking status: Former Smoker   • Smokeless tobacco: Never Used   Substance and Sexual Activity   • Alcohol use: Yes   • Drug use: No   • Sexual activity: Yes     Partners: Male     Birth control/protection: Surgical   Other Topics Concern   • Not on file   Social History Narrative   • Not on file        Current Outpatient Medications:   •  aspirin 81 MG tablet, Take  by mouth., Disp: , Rfl:   •  Calcium Citrate-Vitamin D (CALCIUM CITRATE + PO), Take  by mouth., Disp: , Rfl:   •  citalopram (CeleXA) 40 MG tablet, Take 1 tablet by mouth Daily., Disp: 90 tablet, Rfl: 3  •  montelukast (SINGULAIR) 10 MG tablet, TAKE 1 TABLET BY MOUTH EVERY NIGHT, Disp: 30 tablet, Rfl: 5  •  MULTIPLE VITAMINS-CALCIUM PO, Take  by mouth.,  Disp: , Rfl:   •  pravastatin (PRAVACHOL) 40 MG tablet, Take 1 tablet by mouth Every Night., Disp: 90 tablet, Rfl: 3     Objective     History of Present Illness     Review of Systems   Constitutional: Negative.    HENT: Negative.    Eyes: Negative.    Respiratory: Negative.    Cardiovascular: Negative.    Gastrointestinal: Negative.    Genitourinary: Negative.    Musculoskeletal: Negative.    Skin: Negative.    Neurological: Negative.    Psychiatric/Behavioral: Negative.        Physical Exam   Constitutional: She is oriented to person, place, and time. She appears well-developed and well-nourished.   Pleasant, cooperative no distress blood pressure 130/80   HENT:   Head: Normocephalic and atraumatic.   Eyes: Conjunctivae are normal. Pupils are equal, round, and reactive to light. No scleral icterus.   Neck: Normal range of motion. Neck supple.   Cardiovascular: Normal rate, regular rhythm and normal heart sounds.   Pulmonary/Chest: Effort normal and breath sounds normal. No stridor. No respiratory distress. She has no wheezes. She has no rales.   Musculoskeletal: Normal range of motion. She exhibits no edema.   Neurological: She is alert and oriented to person, place, and time.   Skin: Skin is warm and dry.   Psychiatric: She has a normal mood and affect. Her behavior is normal.   Nursing note and vitals reviewed.      ASSESSMENT  CMP has a sugar of 102 and is otherwise normal and hemoglobin A1c remains quite normal at about 5.4.  TSH was normal.  NMR lipid panel is stable with total cholesterol 157, HDL 45, LDL 88  #1-hyperglycemia, mild, asymptomatic was normal hemoglobin A1c, continue with diet control  #2-hyperlipidemia, adequate control on pravastatin  #3-mild allergies, mostly springtime       Problem List Items Addressed This Visit        Cardiovascular and Mediastinum    Hyperlipidemia - Primary    Relevant Medications    pravastatin (PRAVACHOL) 40 MG tablet       Other    Anxiety          PLAN  the patient  received a flu shot and I recommended the hepatitis A immunizations as well as a Tdap.  She will continue current medicines as now and I'll recheck her in 6 months with a complete physical exam including an EKG, CBC, CMP, lipid panel, hemoglobin A1c, TSH, urinalysis but no chest x-ray unless she's having pulmonary symptoms    There are no Patient Instructions on file for this visit.  Return in about 6 months (around 5/13/2019) for Annual physical, with labs.

## 2018-11-27 ENCOUNTER — APPOINTMENT (OUTPATIENT)
Dept: WOMENS IMAGING | Facility: HOSPITAL | Age: 58
End: 2018-11-27

## 2018-11-27 PROCEDURE — 77067 SCR MAMMO BI INCL CAD: CPT | Performed by: RADIOLOGY

## 2018-11-27 PROCEDURE — 77063 BREAST TOMOSYNTHESIS BI: CPT | Performed by: RADIOLOGY

## 2018-12-05 ENCOUNTER — TELEPHONE (OUTPATIENT)
Dept: OBSTETRICS AND GYNECOLOGY | Facility: CLINIC | Age: 58
End: 2018-12-05

## 2018-12-05 NOTE — TELEPHONE ENCOUNTER
----- Message from Ruthie Lane MD sent at 12/1/2018 11:22 AM EST -----  Please let patient know she had a normal mammogram. She has scattered areas of fibroglandular densities in her breasts.  Follow up in one year.    12/05/18  Called patient to inform results, no answer. Left a message to return call.

## 2018-12-12 ENCOUNTER — OFFICE VISIT (OUTPATIENT)
Dept: FAMILY MEDICINE CLINIC | Facility: CLINIC | Age: 58
End: 2018-12-12

## 2018-12-12 VITALS — HEIGHT: 63 IN | BODY MASS INDEX: 27.46 KG/M2

## 2018-12-12 DIAGNOSIS — N30.90 CYSTITIS: Primary | ICD-10-CM

## 2018-12-12 LAB
BILIRUB BLD-MCNC: NEGATIVE MG/DL
CLARITY, POC: CLEAR
COLOR UR: YELLOW
GLUCOSE UR STRIP-MCNC: NEGATIVE MG/DL
KETONES UR QL: NEGATIVE
LEUKOCYTE EST, POC: NEGATIVE
NITRITE UR-MCNC: NEGATIVE MG/ML
PH UR: 7 [PH] (ref 5–8)
PROT UR STRIP-MCNC: NEGATIVE MG/DL
RBC # UR STRIP: NEGATIVE /UL
SP GR UR: 1.01 (ref 1–1.03)
UROBILINOGEN UR QL: NORMAL

## 2018-12-12 PROCEDURE — 99213 OFFICE O/P EST LOW 20 MIN: CPT | Performed by: INTERNAL MEDICINE

## 2018-12-12 PROCEDURE — 81003 URINALYSIS AUTO W/O SCOPE: CPT | Performed by: INTERNAL MEDICINE

## 2018-12-12 RX ORDER — SULFAMETHOXAZOLE AND TRIMETHOPRIM 800; 160 MG/1; MG/1
1 TABLET ORAL 2 TIMES DAILY
Qty: 6 TABLET | Refills: 0 | Status: SHIPPED | OUTPATIENT
Start: 2018-12-12 | End: 2019-05-14

## 2018-12-12 NOTE — PROGRESS NOTES
Subjective   Estephania Gimenez is a 58 y.o. female. Patient is here today for 2 days of bladder symptoms.  She has some slight discomfort, almost says it's like trying to do a Kegel exercise and mid stream.  She's had no fevers chills and no significant back pain.  She does think she is having to go to the bathroom more often than usual.  Chief Complaint   Patient presents with   • Urinary Tract Infection     pain when voiding          There were no vitals filed for this visit.  The following portions of the patient's history were reviewed and updated as appropriate: allergies, current medications, past family history, past medical history, past social history, past surgical history and problem list.    Past Medical History:   Diagnosis Date   • Cellulitis       No Known Allergies   Social History     Socioeconomic History   • Marital status:      Spouse name: Not on file   • Number of children: Not on file   • Years of education: Not on file   • Highest education level: Not on file   Social Needs   • Financial resource strain: Not on file   • Food insecurity - worry: Not on file   • Food insecurity - inability: Not on file   • Transportation needs - medical: Not on file   • Transportation needs - non-medical: Not on file   Occupational History   • Not on file   Tobacco Use   • Smoking status: Former Smoker   • Smokeless tobacco: Never Used   Substance and Sexual Activity   • Alcohol use: Yes   • Drug use: No   • Sexual activity: Yes     Partners: Male     Birth control/protection: Surgical   Other Topics Concern   • Not on file   Social History Narrative   • Not on file        Current Outpatient Medications:   •  aspirin 81 MG tablet, Take  by mouth., Disp: , Rfl:   •  Calcium Citrate-Vitamin D (CALCIUM CITRATE + PO), Take  by mouth., Disp: , Rfl:   •  citalopram (CeleXA) 40 MG tablet, Take 1 tablet by mouth Daily., Disp: 90 tablet, Rfl: 3  •  montelukast (SINGULAIR) 10 MG tablet, TAKE 1 TABLET BY MOUTH EVERY  NIGHT, Disp: 30 tablet, Rfl: 5  •  MULTIPLE VITAMINS-CALCIUM PO, Take  by mouth., Disp: , Rfl:   •  pravastatin (PRAVACHOL) 40 MG tablet, Take 1 tablet by mouth Every Night., Disp: 90 tablet, Rfl: 3  •  sulfamethoxazole-trimethoprim (BACTRIM DS) 800-160 MG per tablet, Take 1 tablet by mouth 2 (Two) Times a Day., Disp: 6 tablet, Rfl: 0     Objective     History of Present Illness     Review of Systems   Constitutional: Negative.    HENT: Negative.    Eyes: Negative.    Respiratory: Negative.    Cardiovascular: Negative.    Gastrointestinal: Negative.    Genitourinary: Positive for difficulty urinating, dysuria and frequency.   Musculoskeletal: Negative.    Skin: Negative.    Neurological: Negative.    Psychiatric/Behavioral: Negative.        Physical Exam   Constitutional: She appears well-developed and well-nourished.   Pleasant, cooperative no distress   HENT:   Head: Normocephalic and atraumatic.   Eyes: Conjunctivae are normal. Pupils are equal, round, and reactive to light. No scleral icterus.   Neck: Normal range of motion.   Cardiovascular: Normal rate, regular rhythm and normal heart sounds.   Pulmonary/Chest: Effort normal and breath sounds normal. No respiratory distress. She has no wheezes. She has no rales.   Abdominal: Soft.   No CVA tenderness   Musculoskeletal: Normal range of motion. She exhibits no edema.   Neurological: She is alert.   Skin: Skin is warm and dry.   Psychiatric: She has a normal mood and affect. Her behavior is normal.   Nursing note and vitals reviewed.      ASSESSMENT  urinalysis was completely normal.  A urine culture is being sent out and is pending.  Patient's symptoms suggests some type of a mild cystitis early UTI.     Problem List Items Addressed This Visit     None      Visit Diagnoses     Cystitis    -  Primary    Relevant Orders    POC Urinalysis Dipstick, Automated    Urine Culture - Urine, Urine, Clean Catch          PLAN  while awaiting culture results, I'm going to  start her on Septra double strength twice a day for 3 days and encourage plenty of fluids.    There are no Patient Instructions on file for this visit.  No Follow-up on file.

## 2018-12-16 LAB
BACTERIA UR CULT: ABNORMAL
BACTERIA UR CULT: ABNORMAL
OTHER ANTIBIOTIC SUSC ISLT: ABNORMAL

## 2019-01-14 RX ORDER — CITALOPRAM 40 MG/1
40 TABLET ORAL DAILY
Qty: 90 TABLET | Refills: 3 | Status: SHIPPED | OUTPATIENT
Start: 2019-01-14 | End: 2020-01-14 | Stop reason: SDUPTHER

## 2019-01-29 RX ORDER — PRAVASTATIN SODIUM 40 MG
40 TABLET ORAL NIGHTLY
Qty: 90 TABLET | Refills: 3 | Status: SHIPPED | OUTPATIENT
Start: 2019-01-29 | End: 2020-02-19 | Stop reason: SDUPTHER

## 2019-04-26 DIAGNOSIS — F41.9 ANXIETY: ICD-10-CM

## 2019-04-26 DIAGNOSIS — Z00.00 ROUTINE HEALTH MAINTENANCE: Primary | ICD-10-CM

## 2019-04-26 DIAGNOSIS — R73.9 HYPERGLYCEMIA: ICD-10-CM

## 2019-05-07 ENCOUNTER — CLINICAL SUPPORT (OUTPATIENT)
Dept: FAMILY MEDICINE CLINIC | Facility: CLINIC | Age: 59
End: 2019-05-07

## 2019-05-07 DIAGNOSIS — Z00.00 ROUTINE HEALTH MAINTENANCE: Primary | ICD-10-CM

## 2019-05-07 PROCEDURE — 93000 ELECTROCARDIOGRAM COMPLETE: CPT | Performed by: INTERNAL MEDICINE

## 2019-05-07 PROCEDURE — 85025 COMPLETE CBC W/AUTO DIFF WBC: CPT | Performed by: INTERNAL MEDICINE

## 2019-05-08 LAB
ALBUMIN SERPL-MCNC: 4.8 G/DL (ref 3.5–5.2)
ALBUMIN/GLOB SERPL: 1.8 G/DL
ALP SERPL-CCNC: 94 U/L (ref 39–117)
ALT SERPL-CCNC: 27 U/L (ref 1–33)
APPEARANCE UR: CLEAR
AST SERPL-CCNC: 23 U/L (ref 1–32)
BILIRUB SERPL-MCNC: 0.3 MG/DL (ref 0.2–1.2)
BILIRUB UR QL STRIP: NEGATIVE
BUN SERPL-MCNC: 18 MG/DL (ref 8–23)
BUN/CREAT SERPL: 30 (ref 7–25)
CALCIUM SERPL-MCNC: 10.1 MG/DL (ref 8.6–10.5)
CHLORIDE SERPL-SCNC: 101 MMOL/L (ref 98–107)
CHOLEST SERPL-MCNC: 183 MG/DL (ref 0–200)
CO2 SERPL-SCNC: 30.1 MMOL/L (ref 22–29)
COLOR UR: YELLOW
CREAT SERPL-MCNC: 0.6 MG/DL (ref 0.57–1)
GLOBULIN SER CALC-MCNC: 2.7 GM/DL
GLUCOSE SERPL-MCNC: 105 MG/DL (ref 65–99)
GLUCOSE UR QL: NEGATIVE
HBA1C MFR BLD: 5.7 % (ref 4.8–5.6)
HDLC SERPL-MCNC: 50 MG/DL (ref 40–60)
HGB UR QL STRIP: NEGATIVE
KETONES UR QL STRIP: NEGATIVE
LDLC SERPL CALC-MCNC: 100 MG/DL (ref 0–100)
LDLC/HDLC SERPL: 2.01 {RATIO}
LEUKOCYTE ESTERASE UR QL STRIP: NEGATIVE
NITRITE UR QL STRIP: NEGATIVE
PH UR STRIP: 8.5 [PH] (ref 5–8)
POTASSIUM SERPL-SCNC: 4.9 MMOL/L (ref 3.5–5.2)
PROT SERPL-MCNC: 7.5 G/DL (ref 6–8.5)
PROT UR QL STRIP: NEGATIVE
SODIUM SERPL-SCNC: 142 MMOL/L (ref 136–145)
SP GR UR: 1.02 (ref 1–1.03)
TRIGL SERPL-MCNC: 163 MG/DL (ref 0–150)
TSH SERPL DL<=0.005 MIU/L-ACNC: 4.8 MIU/ML (ref 0.27–4.2)
UROBILINOGEN UR STRIP-MCNC: ABNORMAL MG/DL
VLDLC SERPL CALC-MCNC: 32.6 MG/DL

## 2019-05-14 ENCOUNTER — OFFICE VISIT (OUTPATIENT)
Dept: FAMILY MEDICINE CLINIC | Facility: CLINIC | Age: 59
End: 2019-05-14

## 2019-05-14 VITALS
SYSTOLIC BLOOD PRESSURE: 100 MMHG | WEIGHT: 156 LBS | RESPIRATION RATE: 15 BRPM | HEIGHT: 63 IN | TEMPERATURE: 97.3 F | OXYGEN SATURATION: 98 % | BODY MASS INDEX: 27.64 KG/M2 | HEART RATE: 89 BPM | DIASTOLIC BLOOD PRESSURE: 60 MMHG

## 2019-05-14 DIAGNOSIS — E78.5 HYPERLIPIDEMIA, UNSPECIFIED HYPERLIPIDEMIA TYPE: ICD-10-CM

## 2019-05-14 DIAGNOSIS — R73.9 HYPERGLYCEMIA: ICD-10-CM

## 2019-05-14 DIAGNOSIS — Z00.00 HEALTH MAINTENANCE EXAMINATION: Primary | ICD-10-CM

## 2019-05-14 DIAGNOSIS — G44.84 EXERTIONAL HEADACHE: ICD-10-CM

## 2019-05-14 DIAGNOSIS — J30.89 ENVIRONMENTAL AND SEASONAL ALLERGIES: ICD-10-CM

## 2019-05-14 PROCEDURE — 90715 TDAP VACCINE 7 YRS/> IM: CPT | Performed by: INTERNAL MEDICINE

## 2019-05-14 PROCEDURE — 90471 IMMUNIZATION ADMIN: CPT | Performed by: INTERNAL MEDICINE

## 2019-05-14 PROCEDURE — 99396 PREV VISIT EST AGE 40-64: CPT | Performed by: INTERNAL MEDICINE

## 2019-05-14 NOTE — PROGRESS NOTES
Subjective   Estephania Gimenez is a 58 y.o. female. Patient is here today for a complete physical exam.  She generally feels well.  Her only complaints are some difficulty losing weight despite exercising and an occasional intermittent right sided headache that lasts for a second up to a minute and then resolves.  She does notice a little tingling with her right arm in association with that.  PMH-hospitalized in 2017 for an infection at [facility] but no other problems.  SH-,  sexually active, regular gynecologic, dental and vision checks.  Last colonoscopy by Dr. Malcolm Jones in 2016.  Regular exercise.  She is a non-smoker and has about 1 glass of wine per night  FH-patient's father is alive at age 83 with no significant problems.  Mother  age 66 of COPD from cigarette smoking     No chief complaint on file.         Vitals:    19 1431   BP: 100/60   Pulse: 89   Resp: 15   Temp: 97.3 °F (36.3 °C)   SpO2: 98%       The following portions of the patient's history were reviewed and updated as appropriate: allergies, current medications, past family history, past medical history, past social history, past surgical history and problem list.    Past Medical History:   Diagnosis Date   • Cellulitis       No Known Allergies   Social History     Socioeconomic History   • Marital status:      Spouse name: Not on file   • Number of children: Not on file   • Years of education: Not on file   • Highest education level: Not on file   Tobacco Use   • Smoking status: Former Smoker   • Smokeless tobacco: Never Used   Substance and Sexual Activity   • Alcohol use: Yes   • Drug use: No   • Sexual activity: Yes     Partners: Male     Birth control/protection: Surgical        Current Outpatient Medications:   •  aspirin 81 MG tablet, Take  by mouth., Disp: , Rfl:   •  Calcium Citrate-Vitamin D (CALCIUM CITRATE + PO), Take  by mouth., Disp: , Rfl:   •  citalopram (CeleXA) 40 MG tablet, TAKE 1 TABLET BY MOUTH   DAILY, Disp: 90 tablet, Rfl: 3  •  montelukast (SINGULAIR) 10 MG tablet, TAKE 1 TABLET BY MOUTH EVERY NIGHT, Disp: 30 tablet, Rfl: 5  •  MULTIPLE VITAMINS-CALCIUM PO, Take  by mouth., Disp: , Rfl:   •  pravastatin (PRAVACHOL) 40 MG tablet, TAKE 1 TABLET BY MOUTH  EVERY NIGHT, Disp: 90 tablet, Rfl: 3     EKG  ECG Report    Objective   History of Present Illness   Estephania Gimenez 58 y.o. female who presents for an Annual Wellness Visit.  she has a history of   Patient Active Problem List   Diagnosis   • Anxiety   • Hyperlipidemia   • Encounter for gynecological examination without abnormal finding   • Environmental and seasonal allergies   • Hyperglycemia   • Exertional headache   .  she has been doing well with new interval problems.  Labs results discussed in detail with the patient.  Plan to update vaccines if needed today.    Health Habits:  Dental Exam. up to date  Eye Exam. up to date  Exercise: 4 times/week.  Current exercise activities include: aerobics      Lab Results (most recent)     None            Review of Systems   Constitutional: Negative.    Eyes: Negative.    Respiratory: Negative.    Cardiovascular: Negative.    Gastrointestinal: Negative.    Genitourinary: Negative.    Musculoskeletal: Negative.    Skin: Negative.    Neurological: Positive for headaches.   Psychiatric/Behavioral: Negative.        Physical Exam   Constitutional: She is oriented to person, place, and time. She appears well-developed and well-nourished.   Pleasant, cooperative no distress, blood pressure 110/80   HENT:   Head: Normocephalic and atraumatic.   Right Ear: Hearing, tympanic membrane, external ear and ear canal normal.   Left Ear: Hearing, tympanic membrane, external ear and ear canal normal.   Nose: Nose normal. Right sinus exhibits no maxillary sinus tenderness and no frontal sinus tenderness. Left sinus exhibits no maxillary sinus tenderness and no frontal sinus tenderness.   Mouth/Throat: Uvula is midline, oropharynx  is clear and moist and mucous membranes are normal. No tonsillar exudate.   Eyes: Conjunctivae and EOM are normal. Pupils are equal, round, and reactive to light. No scleral icterus.   Neck: Normal range of motion. Neck supple. No JVD present. No tracheal deviation present. No thyromegaly present.   Cardiovascular: Normal rate, regular rhythm, normal heart sounds and intact distal pulses. Exam reveals no gallop and no friction rub.   No murmur heard.  Pulmonary/Chest: Effort normal and breath sounds normal. No stridor. No respiratory distress. She has no wheezes. She has no rales. She exhibits no tenderness.   Abdominal: Soft. Bowel sounds are normal. She exhibits no distension and no mass. There is no tenderness. There is no rebound and no guarding. No hernia.   Musculoskeletal: Normal range of motion. She exhibits no edema, tenderness or deformity.   Lymphadenopathy:     She has no cervical adenopathy.   Neurological: She is alert and oriented to person, place, and time. No cranial nerve deficit.   Skin: Skin is warm and dry.   Psychiatric: She has a normal mood and affect. Her behavior is normal. Judgment and thought content normal.   Nursing note and vitals reviewed.      ASSESSMENT CBC has a minimally low white cell count of 3300 and was otherwise normal.  TSH is slightly high at 4.8.  CMP has a stable sugar of 105 and is otherwise normal.  Hemoglobin A1c was minimally elevated at 5.7 but acceptable.  Urinalysis is clear.  Lipid panel is reasonable with total cholesterol 183, HDL of 50 and .  #1-generally healthy female with  #2-exertional headache that is infrequent and intermittent and lasting just a short period of time of uncertain significance  #3-hyperglycemia, mild and asymptomatic with acceptable hemoglobin A1c  #4-minimally elevated TSH of uncertain significance       Problem List Items Addressed This Visit        Cardiovascular and Mediastinum    Hyperlipidemia       Nervous and Auditory     Exertional headache       Other    Environmental and seasonal allergies    Hyperglycemia      Other Visit Diagnoses     Health maintenance examination    -  Primary          PLAN the patient received a Tdap immunization.  I am going to have her try and do vascular screening to rule out any vascular pathology.  I do not want to get into MRI with MRA at this point.  I will recheck the patient in 4 months with a TSH, free T4 and free T3, CMP, lipid panel and hemoglobin A1c    There are no Patient Instructions on file for this visit.    No Follow-up on file.

## 2019-05-15 ENCOUNTER — TRANSCRIBE ORDERS (OUTPATIENT)
Dept: ADMINISTRATIVE | Facility: HOSPITAL | Age: 59
End: 2019-05-15

## 2019-05-15 DIAGNOSIS — Z13.6 ENCOUNTER FOR SCREENING FOR VASCULAR DISEASE: Primary | ICD-10-CM

## 2019-05-20 ENCOUNTER — HOSPITAL ENCOUNTER (OUTPATIENT)
Dept: CARDIOLOGY | Facility: HOSPITAL | Age: 59
Discharge: HOME | End: 2019-05-20
Admitting: INTERNAL MEDICINE

## 2019-05-20 VITALS
HEART RATE: 58 BPM | SYSTOLIC BLOOD PRESSURE: 116 MMHG | WEIGHT: 156 LBS | BODY MASS INDEX: 26.63 KG/M2 | HEIGHT: 64 IN | DIASTOLIC BLOOD PRESSURE: 80 MMHG

## 2019-05-20 DIAGNOSIS — Z13.6 ENCOUNTER FOR SCREENING FOR VASCULAR DISEASE: ICD-10-CM

## 2019-05-20 LAB
BH CV ECHO MEAS - DIST AO DIAM: 1.27 CM
BH CV VAS BP LEFT ARM: NORMAL MMHG
BH CV VAS BP RIGHT ARM: NORMAL MMHG
BH CV XLRA MEAS - MID AO DIAM: 1.6 CM
BH CV XLRA MEAS - PAD LEFT ABI DP: 1.29
BH CV XLRA MEAS - PAD LEFT ABI PT: 1.32
BH CV XLRA MEAS - PAD LEFT ARM: 116 MMHG
BH CV XLRA MEAS - PAD LEFT LEG DP: 150 MMHG
BH CV XLRA MEAS - PAD LEFT LEG PT: 154 MMHG
BH CV XLRA MEAS - PAD RIGHT ABI DP: 1.31
BH CV XLRA MEAS - PAD RIGHT ABI PT: 1.34
BH CV XLRA MEAS - PAD RIGHT ARM: 116 MMHG
BH CV XLRA MEAS - PAD RIGHT LEG DP: 152 MMHG
BH CV XLRA MEAS - PAD RIGHT LEG PT: 156 MMHG
BH CV XLRA MEAS - PROX AO DIAM: 1.73 CM
BH CV XLRA MEAS LEFT ICA/CCA RATIO: 0.97
BH CV XLRA MEAS LEFT MID CCA PSV: NORMAL CM/SEC
BH CV XLRA MEAS LEFT MID ICA PSV: NORMAL CM/SEC
BH CV XLRA MEAS LEFT PROX ECA PSV: NORMAL CM/SEC
BH CV XLRA MEAS RIGHT ICA/CCA RATIO: 0.87
BH CV XLRA MEAS RIGHT MID CCA PSV: NORMAL CM/SEC
BH CV XLRA MEAS RIGHT MID ICA PSV: NORMAL CM/SEC
BH CV XLRA MEAS RIGHT PROX ECA PSV: NORMAL CM/SEC

## 2019-05-20 PROCEDURE — 93799 UNLISTED CV SVC/PROCEDURE: CPT

## 2019-09-20 DIAGNOSIS — E78.5 HYPERLIPIDEMIA, UNSPECIFIED HYPERLIPIDEMIA TYPE: Primary | ICD-10-CM

## 2019-09-20 DIAGNOSIS — R73.9 HYPERGLYCEMIA: ICD-10-CM

## 2019-09-20 DIAGNOSIS — R79.89 ELEVATED TSH: ICD-10-CM

## 2019-09-24 LAB
ALBUMIN SERPL-MCNC: 4.5 G/DL (ref 3.5–5.2)
ALBUMIN/GLOB SERPL: 1.7 G/DL
ALP SERPL-CCNC: 81 U/L (ref 39–117)
ALT SERPL-CCNC: 19 U/L (ref 1–33)
AST SERPL-CCNC: 16 U/L (ref 1–32)
BILIRUB SERPL-MCNC: 0.2 MG/DL (ref 0.2–1.2)
BUN SERPL-MCNC: 24 MG/DL (ref 8–23)
BUN/CREAT SERPL: 34.3 (ref 7–25)
CALCIUM SERPL-MCNC: 9.2 MG/DL (ref 8.6–10.5)
CHLORIDE SERPL-SCNC: 102 MMOL/L (ref 98–107)
CHOLEST SERPL-MCNC: 163 MG/DL (ref 0–200)
CO2 SERPL-SCNC: 27.3 MMOL/L (ref 22–29)
CREAT SERPL-MCNC: 0.7 MG/DL (ref 0.57–1)
GLOBULIN SER CALC-MCNC: 2.7 GM/DL
GLUCOSE SERPL-MCNC: 89 MG/DL (ref 65–99)
HBA1C MFR BLD: 5.6 % (ref 4.8–5.6)
HDLC SERPL-MCNC: 45 MG/DL (ref 40–60)
LDLC SERPL CALC-MCNC: 86 MG/DL (ref 0–100)
LDLC/HDLC SERPL: 1.91 {RATIO}
POTASSIUM SERPL-SCNC: 5.1 MMOL/L (ref 3.5–5.2)
PROT SERPL-MCNC: 7.2 G/DL (ref 6–8.5)
SODIUM SERPL-SCNC: 141 MMOL/L (ref 136–145)
T3FREE SERPL-MCNC: 2.9 PG/ML (ref 2–4.4)
T4 FREE SERPL-MCNC: 0.78 NG/DL (ref 0.93–1.7)
TRIGL SERPL-MCNC: 160 MG/DL (ref 0–150)
TSH SERPL DL<=0.005 MIU/L-ACNC: 2.21 UIU/ML (ref 0.27–4.2)
VLDLC SERPL CALC-MCNC: 32 MG/DL

## 2019-09-26 ENCOUNTER — OFFICE VISIT (OUTPATIENT)
Dept: FAMILY MEDICINE CLINIC | Facility: CLINIC | Age: 59
End: 2019-09-26

## 2019-09-26 VITALS
HEART RATE: 71 BPM | SYSTOLIC BLOOD PRESSURE: 120 MMHG | DIASTOLIC BLOOD PRESSURE: 80 MMHG | WEIGHT: 155.4 LBS | RESPIRATION RATE: 16 BRPM | OXYGEN SATURATION: 98 % | TEMPERATURE: 98.3 F | HEIGHT: 64 IN | BODY MASS INDEX: 26.53 KG/M2

## 2019-09-26 DIAGNOSIS — Z23 NEED FOR IMMUNIZATION AGAINST INFLUENZA: Primary | ICD-10-CM

## 2019-09-26 DIAGNOSIS — F41.9 ANXIETY: ICD-10-CM

## 2019-09-26 DIAGNOSIS — J30.89 ENVIRONMENTAL AND SEASONAL ALLERGIES: ICD-10-CM

## 2019-09-26 DIAGNOSIS — E78.5 HYPERLIPIDEMIA, UNSPECIFIED HYPERLIPIDEMIA TYPE: ICD-10-CM

## 2019-09-26 DIAGNOSIS — R73.9 HYPERGLYCEMIA: ICD-10-CM

## 2019-09-26 PROCEDURE — 90674 CCIIV4 VAC NO PRSV 0.5 ML IM: CPT | Performed by: INTERNAL MEDICINE

## 2019-09-26 PROCEDURE — 90471 IMMUNIZATION ADMIN: CPT | Performed by: INTERNAL MEDICINE

## 2019-09-26 PROCEDURE — 99214 OFFICE O/P EST MOD 30 MIN: CPT | Performed by: INTERNAL MEDICINE

## 2019-09-26 NOTE — PROGRESS NOTES
Subjective   Estephania Gimenez is a 59 y.o. female. Patient is here today for follow-up on her hyperlipidemia, history of hyperglycemia, environmental allergies and some mild anxiety.  She is generally been stable on medications.  She just returned from a trip to [facility] and had no problems.  She has had no chest pain, shortness of breath, edema or myalgias.  Chief Complaint   Patient presents with   • Hyperlipidemia     HYPERGLYCEMIA- FOLLOW UP LABS          Vitals:    09/26/19 0950   BP: 120/80   Pulse: 71   Resp: 16   Temp: 98.3 °F (36.8 °C)   SpO2: 98%     The following portions of the patient's history were reviewed and updated as appropriate: allergies, current medications, past family history, past medical history, past social history, past surgical history and problem list.    Past Medical History:   Diagnosis Date   • Cellulitis       No Known Allergies   Social History     Socioeconomic History   • Marital status:      Spouse name: Not on file   • Number of children: Not on file   • Years of education: Not on file   • Highest education level: Not on file   Tobacco Use   • Smoking status: Former Smoker   • Smokeless tobacco: Never Used   Substance and Sexual Activity   • Alcohol use: Yes   • Drug use: No   • Sexual activity: Yes     Partners: Male     Birth control/protection: Surgical        Current Outpatient Medications:   •  aspirin 81 MG tablet, Take  by mouth., Disp: , Rfl:   •  Calcium Citrate-Vitamin D (CALCIUM CITRATE + PO), Take  by mouth., Disp: , Rfl:   •  citalopram (CeleXA) 40 MG tablet, TAKE 1 TABLET BY MOUTH  DAILY, Disp: 90 tablet, Rfl: 3  •  MULTIPLE VITAMINS-CALCIUM PO, Take  by mouth., Disp: , Rfl:   •  pravastatin (PRAVACHOL) 40 MG tablet, TAKE 1 TABLET BY MOUTH  EVERY NIGHT, Disp: 90 tablet, Rfl: 3     Objective     History of Present Illness     Review of Systems   Constitutional: Negative.    HENT: Negative.    Eyes: Negative.    Respiratory: Negative.    Cardiovascular:  Negative.    Gastrointestinal: Negative.    Genitourinary: Negative.    Musculoskeletal: Negative.    Skin: Negative.    Neurological: Negative.    Psychiatric/Behavioral: Negative.        Physical Exam   Constitutional: She is oriented to person, place, and time. She appears well-developed and well-nourished.   Pleasant, cooperative no acute distress, blood pressure 120/80   HENT:   Head: Normocephalic and atraumatic.   Eyes: Conjunctivae are normal. Pupils are equal, round, and reactive to light. No scleral icterus.   Neck: Normal range of motion. Neck supple.   Cardiovascular: Normal rate, regular rhythm and normal heart sounds.   Pulmonary/Chest: Effort normal and breath sounds normal. No respiratory distress. She has no wheezes. She has no rales.   Musculoskeletal: Normal range of motion. She exhibits no edema.   Neurological: She is alert and oriented to person, place, and time.   Skin: Skin is warm and dry.   Psychiatric: She has a normal mood and affect. Her behavior is normal.   Nursing note and vitals reviewed.      ASSESSMENT CMP was essentially normal.  Lipid panel is stable with total cholesterol 163, HDL of 45 and LDL 86.  Hemoglobin A1c is normal at 5.6.  TSH and free T3 were normal and free T4 is just minimally low.  Clinically the patient's euthyroid  #1-hyperlipidemia, controlled on medication  #2-history of hyperglycemia with normal sugar and hemoglobin A1c today  #3-environmental and seasonal allergies, stable and asymptomatic  #4-anxiety, controlled on medication     Problem List Items Addressed This Visit        Cardiovascular and Mediastinum    Hyperlipidemia       Other    Anxiety    Environmental and seasonal allergies    Hyperglycemia      Other Visit Diagnoses     Need for immunization against influenza    -  Primary    Relevant Orders    Flucelvax Quad=>4Years (PFS) (Completed)          PLAN the patient received a flu shot today.  I recommended the hepatitis A and shingles immunizations  for the patient.  She will continue current medicines as now on I will recheck her in 6 months with a CBC, CMP, lipid panel and hemoglobin A1c    There are no Patient Instructions on file for this visit.  Return in about 6 months (around 3/26/2020) for with labs.

## 2019-12-09 ENCOUNTER — APPOINTMENT (OUTPATIENT)
Dept: WOMENS IMAGING | Facility: HOSPITAL | Age: 59
End: 2019-12-09

## 2019-12-09 PROCEDURE — 77067 SCR MAMMO BI INCL CAD: CPT | Performed by: RADIOLOGY

## 2019-12-09 PROCEDURE — 77063 BREAST TOMOSYNTHESIS BI: CPT | Performed by: RADIOLOGY

## 2019-12-13 ENCOUNTER — TELEPHONE (OUTPATIENT)
Dept: OBSTETRICS AND GYNECOLOGY | Facility: CLINIC | Age: 59
End: 2019-12-13

## 2019-12-13 DIAGNOSIS — R92.8 ABNORMAL MAMMOGRAM: Primary | ICD-10-CM

## 2019-12-13 NOTE — TELEPHONE ENCOUNTER
----- Message from Robbin Mullen MD sent at 12/13/2019 11:30 AM EST -----  Felecia, they want diagnostic ultrasound and mammogram on her right breast. Can you let her know and help arrange for her follow up. Thanks, Dr. Sebas KIRK, Dr. Lane patient.

## 2020-01-06 ENCOUNTER — APPOINTMENT (OUTPATIENT)
Dept: WOMENS IMAGING | Facility: HOSPITAL | Age: 60
End: 2020-01-06

## 2020-01-06 PROCEDURE — 77065 DX MAMMO INCL CAD UNI: CPT | Performed by: RADIOLOGY

## 2020-01-06 PROCEDURE — 76641 ULTRASOUND BREAST COMPLETE: CPT | Performed by: RADIOLOGY

## 2020-01-06 PROCEDURE — 77061 BREAST TOMOSYNTHESIS UNI: CPT | Performed by: RADIOLOGY

## 2020-01-06 PROCEDURE — G0279 TOMOSYNTHESIS, MAMMO: HCPCS | Performed by: RADIOLOGY

## 2020-01-06 PROCEDURE — MDREVIEWSP: Performed by: RADIOLOGY

## 2020-01-08 ENCOUNTER — TELEPHONE (OUTPATIENT)
Dept: OBSTETRICS AND GYNECOLOGY | Facility: CLINIC | Age: 60
End: 2020-01-08

## 2020-01-08 NOTE — TELEPHONE ENCOUNTER
----- Message from Robbin Mullen MD sent at 1/8/2020 11:50 AM EST -----  Felecia, her follow up mammogram/breast ultrasound was read as benign. They recommend repeat follow up in one year (annual screening). Please let her know. Thanks, Dr. Mullen

## 2020-01-08 NOTE — TELEPHONE ENCOUNTER
L/m for pt to call.      Normal breast follow up imaging, pt may resume yearly screening mammograms.     Pt #[phone]

## 2020-01-14 RX ORDER — CITALOPRAM 40 MG/1
40 TABLET ORAL DAILY
Qty: 90 TABLET | Refills: 3 | Status: SHIPPED | OUTPATIENT
Start: 2020-01-14 | End: 2021-02-01

## 2020-02-18 ENCOUNTER — OFFICE VISIT (OUTPATIENT)
Dept: OBSTETRICS AND GYNECOLOGY | Facility: CLINIC | Age: 60
End: 2020-02-18

## 2020-02-18 VITALS
HEART RATE: 80 BPM | WEIGHT: 157 LBS | SYSTOLIC BLOOD PRESSURE: 105 MMHG | DIASTOLIC BLOOD PRESSURE: 64 MMHG | HEIGHT: 64 IN | BODY MASS INDEX: 26.8 KG/M2

## 2020-02-18 DIAGNOSIS — N93.0 PCB (POST COITAL BLEEDING): Primary | ICD-10-CM

## 2020-02-18 PROCEDURE — 99396 PREV VISIT EST AGE 40-64: CPT | Performed by: OBSTETRICS & GYNECOLOGY

## 2020-02-18 NOTE — PROGRESS NOTES
GYN Annual Exam     CC- Here for annual exam.     Estephania Gimenez is a 59 y.o. female who presents for annual well woman exam. Periods are absent due to Menopause.    Pt notices some spotting with intercourse occasionally, denies any pain.   Pt has a bump on the R labia that is new to her.     OB History        1    Para   1    Term   1            AB        Living   1       SAB        TAB        Ectopic        Molar        Multiple        Live Births   1                Current contraception: post menopausal status  History of abnormal Pap smear: yes - no treatment required  Family history of uterine, colon or ovarian cancer: yes - paternal uncle, colon cancer   History of abnormal mammogram: no  Family history of breast cancer: yes - Maternal and paternal aunt   Last Pap : 2018 NL HPV neg  Last mammogram: 2019  Last colonoscopy: 4 yrs ago  Last DEXA: 2018-mild to moderate osteopenia in spine  Parental Hip Fracture: none    Past Medical History:   Diagnosis Date   • Anxiety    • Cellulitis    • Hyperlipidemia        Past Surgical History:   Procedure Laterality Date   • COLONOSCOPY     • TUBAL ABDOMINAL LIGATION     • WISDOM TOOTH EXTRACTION           Current Outpatient Medications:   •  aspirin 81 MG tablet, Take  by mouth., Disp: , Rfl:   •  Calcium Citrate-Vitamin D (CALCIUM CITRATE + PO), Take  by mouth., Disp: , Rfl:   •  citalopram (CeleXA) 40 MG tablet, Take 1 tablet by mouth Daily., Disp: 90 tablet, Rfl: 3  •  MULTIPLE VITAMINS-CALCIUM PO, Take  by mouth., Disp: , Rfl:   •  pravastatin (PRAVACHOL) 40 MG tablet, TAKE 1 TABLET BY MOUTH  EVERY NIGHT, Disp: 90 tablet, Rfl: 3    No Known Allergies    Social History     Tobacco Use   • Smoking status: Former Smoker   • Smokeless tobacco: Never Used   Substance Use Topics   • Alcohol use: Yes   • Drug use: No       Family History   Problem Relation Age of Onset   • No Known Problems Mother    • No Known Problems Father    • Cancer  "Maternal Aunt    • Breast cancer Maternal Aunt         over 60   • Breast cancer Paternal Aunt         over 60   • Throat cancer Paternal Uncle    • Heart disease Maternal Grandmother    • Colon cancer Paternal Uncle    • Heart disease Paternal Uncle    • Heart disease Paternal Uncle    • Ovarian cancer Neg Hx    • Uterine cancer Neg Hx        Review of Systems   Constitutional: Negative for chills, fever and unexpected weight loss.   Gastrointestinal: Negative for abdominal pain.   Genitourinary: Negative for dysuria, pelvic pain, vaginal bleeding and vaginal discharge.   All other systems reviewed and are negative.      /64   Pulse 80   Ht 162.6 cm (64\")   Wt 71.2 kg (157 lb)   Breastfeeding No   BMI 26.95 kg/m²     Physical Exam   Constitutional: She is oriented to person, place, and time. She appears well-developed and well-nourished. No distress. She is not obese.  HENT:   Head: Normocephalic and atraumatic.   Eyes: Conjunctivae are normal. Right eye exhibits no discharge. Left eye exhibits no discharge.   Neck: Normal range of motion. Neck supple. No thyromegaly present.   Cardiovascular: Normal rate, regular rhythm and normal heart sounds.   No murmur heard.  Pulmonary/Chest: Effort normal and breath sounds normal. No respiratory distress. Right breast exhibits no inverted nipple, no mass and no nipple discharge. Left breast exhibits no inverted nipple, no mass and no nipple discharge.   Abdominal: Soft. Bowel sounds are normal. She exhibits no distension. There is no tenderness.   Genitourinary: Cervix normal. Pelvic exam was performed with patient supine. There is no lesion or Bartholin's cyst on the right labia. There is no lesion or Bartholin's cyst on the left labia. Uterus is anteverted. Uterus is not deviated, enlarged, fixed or exhibiting a mass. Cervix does not exhibit motion tenderness or friability. Right adnexum is non-palpable.Left adnexum is non-palpable.Vagina exhibits loss of rugae. " No bleeding in the vagina. No vaginal discharge found.   Musculoskeletal: Normal range of motion. She exhibits no edema.   Lymphadenopathy:     She has no cervical adenopathy.        Right: No inguinal adenopathy present.        Left: No inguinal adenopathy present.   Neurological: She is alert and oriented to person, place, and time.   Skin: Skin is warm and dry. No rash noted.   Psychiatric: She has a normal mood and affect. Her behavior is normal. Judgment and thought content normal.          Assessment     1) GYN annual well woman exam.   Area of concern normal on right labia   2) Post coital bleeding   Check ultrasound      Plan     1) Breast Health - Clinical breast exam & mammogram and ACS, Self breast awareness monthly  2) Pap - up to date   3) Smoking status- non-smoker   4) Colon health - screening colonoscopy up to date  5) Bone health - Weight bearing exercise, dietary calcium recommendations and vitamin D reviewed.   FRAX currently 12% and 0.5%   Repeat DEXA at 65%   6) Activity recommends - Adult 150-300 min/week of multi-component physical activities that include balance training, aerobic and physical strengthening.    Avoidance of distracted driving - texts/phone calls while driving.   7) Follow up prn and one year      Robbin Mullen MD   2/18/2020  11:51 AM

## 2020-02-19 RX ORDER — PRAVASTATIN SODIUM 40 MG
40 TABLET ORAL NIGHTLY
Qty: 90 TABLET | Refills: 1 | Status: SHIPPED | OUTPATIENT
Start: 2020-02-19 | End: 2020-08-26

## 2020-02-20 ENCOUNTER — PROCEDURE VISIT (OUTPATIENT)
Dept: OBSTETRICS AND GYNECOLOGY | Facility: CLINIC | Age: 60
End: 2020-02-20

## 2020-02-20 DIAGNOSIS — N93.0 PCB (POST COITAL BLEEDING): Primary | ICD-10-CM

## 2020-02-20 PROCEDURE — 76830 TRANSVAGINAL US NON-OB: CPT | Performed by: OBSTETRICS & GYNECOLOGY

## 2020-02-21 ENCOUNTER — TELEPHONE (OUTPATIENT)
Dept: OBSTETRICS AND GYNECOLOGY | Facility: CLINIC | Age: 60
End: 2020-02-21

## 2020-02-21 NOTE — TELEPHONE ENCOUNTER
Gosia,     Ultrasound 2/20/2020    Uterus normal at 3.5 cm   EL thin at 0.1 cm   1 cm fibroid   No mass on ovaries.     Bleeding must be related to vaginal trauma   No evidence of polyp, mass or cyst to concern us.     Please let her know.     Thanks,   Dr. Mullen

## 2020-03-25 DIAGNOSIS — E78.5 HYPERLIPIDEMIA, UNSPECIFIED HYPERLIPIDEMIA TYPE: Primary | ICD-10-CM

## 2020-03-25 DIAGNOSIS — R73.9 HYPERGLYCEMIA: ICD-10-CM

## 2020-07-22 DIAGNOSIS — E78.5 HYPERLIPIDEMIA, UNSPECIFIED HYPERLIPIDEMIA TYPE: ICD-10-CM

## 2020-07-22 DIAGNOSIS — R73.9 HYPERGLYCEMIA: ICD-10-CM

## 2020-07-23 ENCOUNTER — RESULTS ENCOUNTER (OUTPATIENT)
Dept: FAMILY MEDICINE CLINIC | Facility: CLINIC | Age: 60
End: 2020-07-23

## 2020-07-23 DIAGNOSIS — R73.9 HYPERGLYCEMIA: ICD-10-CM

## 2020-07-23 DIAGNOSIS — E78.5 HYPERLIPIDEMIA, UNSPECIFIED HYPERLIPIDEMIA TYPE: ICD-10-CM

## 2020-07-25 LAB
ALBUMIN SERPL-MCNC: 4.7 G/DL (ref 3.5–5.2)
ALBUMIN/GLOB SERPL: 1.8 G/DL
ALP SERPL-CCNC: 78 U/L (ref 39–117)
ALT SERPL-CCNC: 14 U/L (ref 1–33)
AST SERPL-CCNC: 15 U/L (ref 1–32)
BASOPHILS # BLD AUTO: 0.02 10*3/MM3 (ref 0–0.2)
BASOPHILS NFR BLD AUTO: 0.5 % (ref 0–1.5)
BILIRUB SERPL-MCNC: 0.4 MG/DL (ref 0–1.2)
BUN SERPL-MCNC: 21 MG/DL (ref 8–23)
BUN/CREAT SERPL: 25.9 (ref 7–25)
CALCIUM SERPL-MCNC: 9.6 MG/DL (ref 8.6–10.5)
CHLORIDE SERPL-SCNC: 102 MMOL/L (ref 98–107)
CHOLEST SERPL-MCNC: 154 MG/DL (ref 0–200)
CO2 SERPL-SCNC: 29 MMOL/L (ref 22–29)
CREAT SERPL-MCNC: 0.81 MG/DL (ref 0.57–1)
EOSINOPHIL # BLD AUTO: 0.08 10*3/MM3 (ref 0–0.4)
EOSINOPHIL NFR BLD AUTO: 2.2 % (ref 0.3–6.2)
ERYTHROCYTE [DISTWIDTH] IN BLOOD BY AUTOMATED COUNT: 12.5 % (ref 12.3–15.4)
GLOBULIN SER CALC-MCNC: 2.6 GM/DL
GLUCOSE SERPL-MCNC: 91 MG/DL (ref 65–99)
HBA1C MFR BLD: 5.4 % (ref 4.8–5.6)
HCT VFR BLD AUTO: 38.7 % (ref 34–46.6)
HDLC SERPL-MCNC: 45 MG/DL (ref 40–60)
HGB BLD-MCNC: 12.8 G/DL (ref 12–15.9)
IMM GRANULOCYTES # BLD AUTO: 0.01 10*3/MM3 (ref 0–0.05)
IMM GRANULOCYTES NFR BLD AUTO: 0.3 % (ref 0–0.5)
LDLC SERPL CALC-MCNC: 89 MG/DL (ref 0–100)
LDLC/HDLC SERPL: 1.98 {RATIO}
LYMPHOCYTES # BLD AUTO: 1.09 10*3/MM3 (ref 0.7–3.1)
LYMPHOCYTES NFR BLD AUTO: 29.5 % (ref 19.6–45.3)
MCH RBC QN AUTO: 31.2 PG (ref 26.6–33)
MCHC RBC AUTO-ENTMCNC: 33.1 G/DL (ref 31.5–35.7)
MCV RBC AUTO: 94.4 FL (ref 79–97)
MONOCYTES # BLD AUTO: 0.39 10*3/MM3 (ref 0.1–0.9)
MONOCYTES NFR BLD AUTO: 10.6 % (ref 5–12)
NEUTROPHILS # BLD AUTO: 2.1 10*3/MM3 (ref 1.7–7)
NEUTROPHILS NFR BLD AUTO: 56.9 % (ref 42.7–76)
NRBC BLD AUTO-RTO: 0 /100 WBC (ref 0–0.2)
PLATELET # BLD AUTO: 248 10*3/MM3 (ref 140–450)
POTASSIUM SERPL-SCNC: 4.7 MMOL/L (ref 3.5–5.2)
PROT SERPL-MCNC: 7.3 G/DL (ref 6–8.5)
RBC # BLD AUTO: 4.1 10*6/MM3 (ref 3.77–5.28)
SODIUM SERPL-SCNC: 140 MMOL/L (ref 136–145)
TRIGL SERPL-MCNC: 100 MG/DL (ref 0–150)
VLDLC SERPL CALC-MCNC: 20 MG/DL
WBC # BLD AUTO: 3.69 10*3/MM3 (ref 3.4–10.8)

## 2020-07-30 ENCOUNTER — OFFICE VISIT (OUTPATIENT)
Dept: FAMILY MEDICINE CLINIC | Facility: CLINIC | Age: 60
End: 2020-07-30

## 2020-07-30 VITALS
HEIGHT: 64 IN | OXYGEN SATURATION: 98 % | WEIGHT: 158.2 LBS | DIASTOLIC BLOOD PRESSURE: 80 MMHG | HEART RATE: 88 BPM | TEMPERATURE: 98.4 F | RESPIRATION RATE: 16 BRPM | SYSTOLIC BLOOD PRESSURE: 110 MMHG | BODY MASS INDEX: 27.01 KG/M2

## 2020-07-30 DIAGNOSIS — E78.5 HYPERLIPIDEMIA, UNSPECIFIED HYPERLIPIDEMIA TYPE: Primary | ICD-10-CM

## 2020-07-30 DIAGNOSIS — R73.9 HYPERGLYCEMIA: ICD-10-CM

## 2020-07-30 DIAGNOSIS — J30.89 ENVIRONMENTAL AND SEASONAL ALLERGIES: ICD-10-CM

## 2020-07-30 DIAGNOSIS — Z78.0 POST-MENOPAUSAL: ICD-10-CM

## 2020-07-30 DIAGNOSIS — F41.9 ANXIETY: ICD-10-CM

## 2020-07-30 PROCEDURE — 99214 OFFICE O/P EST MOD 30 MIN: CPT | Performed by: INTERNAL MEDICINE

## 2020-07-30 NOTE — PROGRESS NOTES
Subjective   Estephania Gimenez is a 60 y.o. female. Patient is here today for follow-up on hyperlipidemia, hyperglycemia, seasonal allergies and some mild anxiety.  She is generally doing well and has no acute complaints and has had no chest pain, shortness of breath, edema or myalgias her anxiety is controlled on citalopram  Chief Complaint   Patient presents with   • Hyperlipidemia     HYPERGLYCEMIA- FOLLOW UP LABS          Vitals:    07/30/20 1558   BP: 110/80   Pulse: 88   Resp: 16   Temp: 98.4 °F (36.9 °C)   SpO2: 98%     Body mass index is 27.14 kg/m².  The following portions of the patient's history were reviewed and updated as appropriate: allergies, current medications, past family history, past medical history, past social history, past surgical history and problem list.    Past Medical History:   Diagnosis Date   • Anxiety    • Cellulitis    • Hyperlipidemia       No Known Allergies   Social History     Socioeconomic History   • Marital status:      Spouse name: Not on file   • Number of children: Not on file   • Years of education: Not on file   • Highest education level: Not on file   Tobacco Use   • Smoking status: Former Smoker   • Smokeless tobacco: Never Used   Substance and Sexual Activity   • Alcohol use: Yes   • Drug use: No   • Sexual activity: Yes     Partners: Male     Birth control/protection: Surgical        Current Outpatient Medications:   •  aspirin 81 MG tablet, Take  by mouth., Disp: , Rfl:   •  Calcium Citrate-Vitamin D (CALCIUM CITRATE + PO), Take  by mouth., Disp: , Rfl:   •  citalopram (CeleXA) 40 MG tablet, Take 1 tablet by mouth Daily., Disp: 90 tablet, Rfl: 3  •  MULTIPLE VITAMINS-CALCIUM PO, Take  by mouth., Disp: , Rfl:   •  pravastatin (PRAVACHOL) 40 MG tablet, Take 1 tablet by mouth Every Night., Disp: 90 tablet, Rfl: 1     Objective     History of Present Illness     Review of Systems   Constitutional: Negative.    HENT: Negative.    Respiratory: Negative.     Cardiovascular: Negative.    Gastrointestinal: Negative.    Genitourinary: Negative.    Musculoskeletal: Negative.    Skin: Negative.    Neurological: Negative.    Psychiatric/Behavioral: Negative.        Physical Exam   Constitutional: She is oriented to person, place, and time. She appears well-developed and well-nourished.   Pleasant, cooperative no acute distress   HENT:   Head: Normocephalic and atraumatic.   Eyes: Conjunctivae are normal. No scleral icterus.   Neck: Normal range of motion. Neck supple.   Cardiovascular: Normal rate, regular rhythm and normal heart sounds.   Pulmonary/Chest: Effort normal and breath sounds normal. No respiratory distress. She has no wheezes. She has no rales.   Musculoskeletal: Normal range of motion. She exhibits no edema.   Neurological: She is alert and oriented to person, place, and time.   Skin: Skin is warm and dry.   Psychiatric: She has a normal mood and affect. Her behavior is normal.   Nursing note and vitals reviewed.      ASSESSMENT CBC and CMP are completely normal.  Hemoglobin A1c is normal at 5.4.  Lipid panel is well controlled on medication with total cholesterol 154, HDL 45 and LDL 89  #1-hyperlipidemia, controlled on medication  #2-history of hyperglycemia with normal sugar and hemoglobin A1c today  #3-anxiety, controlled on medication  #4-seasonal allergies, controlled on medication   #5-history of osteopenia     Problem List Items Addressed This Visit        Cardiovascular and Mediastinum    Hyperlipidemia - Primary       Other    Anxiety    Environmental and seasonal allergies    Hyperglycemia      Other Visit Diagnoses     Post-menopausal        Relevant Orders    DEXA Bone Density Axial          PLAN I ordered a bone density test on the patient as it is been 2 years.  She will continue current medicines as now and I recommended a flu shot in October.  I would like to recheck her in 6 months with a CMP, lipid panel, TSH and vitamin D level    There are  no Patient Instructions on file for this visit.  Return in about 6 months (around 1/30/2021) for with labs.

## 2020-08-07 ENCOUNTER — HOSPITAL ENCOUNTER (OUTPATIENT)
Dept: BONE DENSITY | Facility: HOSPITAL | Age: 60
Discharge: HOME | End: 2020-08-07
Admitting: INTERNAL MEDICINE

## 2020-08-07 DIAGNOSIS — Z78.0 POST-MENOPAUSAL: ICD-10-CM

## 2020-08-07 PROCEDURE — 77080 DXA BONE DENSITY AXIAL: CPT

## 2020-08-26 RX ORDER — PRAVASTATIN SODIUM 40 MG
40 TABLET ORAL NIGHTLY
Qty: 90 TABLET | Refills: 1 | Status: SHIPPED | OUTPATIENT
Start: 2020-08-26 | End: 2021-02-09 | Stop reason: SDUPTHER

## 2020-09-14 ENCOUNTER — TELEPHONE (OUTPATIENT)
Dept: FAMILY MEDICINE CLINIC | Facility: CLINIC | Age: 60
End: 2020-09-14

## 2020-09-14 NOTE — TELEPHONE ENCOUNTER
PATIENT CALLED FOR MED REQUEST FOR NECK PAIN, SHE BELIEVES SHE SLEPT ON IT WRONG. SHE IS IN [place] FOR THE WEEK.     [id number] - [place], [place] - [address] AT [facility] - [phone] [facility] [phone]   [phone]      PATIENT CALL BACK NUMBER [phone]  PLEASE CALL PATIENT WHEN CALLED TO PHARMACY

## 2020-09-15 NOTE — TELEPHONE ENCOUNTER
PATIENT CALLING BACK TO FOLLOW UP ON REQUEST FOR POSSIBLE MUSCLE RELAXER TO BE CALLED IN TO PHARMACY REQUESTED IN [place] , STATES SHE IS ON VACATION AND IS NOT ABLE TO MOVE.   PATIENT REQUEST CALL BACK WHEN THIS REQUEST IS CONSIDERED SO SHE WILL BE AWARE.     PT CALL BACK # [phone]

## 2020-09-15 NOTE — TELEPHONE ENCOUNTER
Patient is calling back to check the status of request for Flexeril RX.    Please advise.    Patient call back [phone]

## 2020-09-16 RX ORDER — CYCLOBENZAPRINE HCL 10 MG
10 TABLET ORAL 3 TIMES DAILY PRN
Qty: 15 TABLET | Refills: 0 | Status: SHIPPED | OUTPATIENT
Start: 2020-09-16 | End: 2020-09-30 | Stop reason: SDUPTHER

## 2020-09-30 ENCOUNTER — OFFICE VISIT (OUTPATIENT)
Dept: FAMILY MEDICINE CLINIC | Facility: CLINIC | Age: 60
End: 2020-09-30

## 2020-09-30 VITALS
DIASTOLIC BLOOD PRESSURE: 70 MMHG | RESPIRATION RATE: 16 BRPM | OXYGEN SATURATION: 95 % | WEIGHT: 155.4 LBS | HEART RATE: 70 BPM | TEMPERATURE: 97.3 F | HEIGHT: 64 IN | SYSTOLIC BLOOD PRESSURE: 104 MMHG | BODY MASS INDEX: 26.53 KG/M2

## 2020-09-30 DIAGNOSIS — Z23 NEED FOR IMMUNIZATION AGAINST INFLUENZA: Primary | ICD-10-CM

## 2020-09-30 DIAGNOSIS — M54.2 NECK PAIN, ACUTE: ICD-10-CM

## 2020-09-30 PROCEDURE — 90471 IMMUNIZATION ADMIN: CPT | Performed by: INTERNAL MEDICINE

## 2020-09-30 PROCEDURE — 99213 OFFICE O/P EST LOW 20 MIN: CPT | Performed by: INTERNAL MEDICINE

## 2020-09-30 PROCEDURE — 90686 IIV4 VACC NO PRSV 0.5 ML IM: CPT | Performed by: INTERNAL MEDICINE

## 2020-09-30 RX ORDER — CYCLOBENZAPRINE HCL 10 MG
10 TABLET ORAL 3 TIMES DAILY PRN
Qty: 15 TABLET | Refills: 0 | Status: SHIPPED | OUTPATIENT
Start: 2020-09-30 | End: 2020-10-28 | Stop reason: SDUPTHER

## 2020-09-30 RX ORDER — METHYLPREDNISOLONE 4 MG/1
TABLET ORAL
Qty: 21 TABLET | Refills: 0 | Status: SHIPPED | OUTPATIENT
Start: 2020-09-30 | End: 2020-10-28

## 2020-09-30 NOTE — PROGRESS NOTES
Subjective   Estephania Gimenez is a 60 y.o. female. Patient is here today for follow-up on neck pain.  This started about 3 weeks ago when she just awakened from sleep with it.  Initially she could barely turn her head without supporting the muscles.  And has improved somewhat but she still has limited range of motion.  A muscle relaxant and heat are not terribly helpful but ibuprofen is somewhat helpful.  She has had no known injury and is having no radicular symptoms.  It is primarily on the left side of her neck  Chief Complaint   Patient presents with   • Neck Pain     PT THINKS SHE HAS A STRAIN IN HER NECK FOR ABOUT 3 WEEKS, NOT GETTING BETTER          Vitals:    09/30/20 0800   BP: 104/70   Pulse: 70   Resp: 16   Temp: 97.3 °F (36.3 °C)   SpO2: 95%     Body mass index is 26.66 kg/m².  The following portions of the patient's history were reviewed and updated as appropriate: allergies, current medications, past family history, past medical history, past social history, past surgical history and problem list.    Past Medical History:   Diagnosis Date   • Anxiety    • Cellulitis    • Hyperlipidemia       No Known Allergies   Social History     Socioeconomic History   • Marital status:      Spouse name: Not on file   • Number of children: Not on file   • Years of education: Not on file   • Highest education level: Not on file   Tobacco Use   • Smoking status: Former Smoker   • Smokeless tobacco: Never Used   Substance and Sexual Activity   • Alcohol use: Yes   • Drug use: No   • Sexual activity: Yes     Partners: Male     Birth control/protection: Surgical        Current Outpatient Medications:   •  aspirin 81 MG tablet, Take  by mouth., Disp: , Rfl:   •  Calcium Citrate-Vitamin D (CALCIUM CITRATE + PO), Take  by mouth., Disp: , Rfl:   •  citalopram (CeleXA) 40 MG tablet, Take 1 tablet by mouth Daily., Disp: 90 tablet, Rfl: 3  •  cyclobenzaprine (FLEXERIL) 10 MG tablet, Take 1 tablet by mouth 3 (Three) Times  a Day As Needed for Muscle Spasms (neck pain.)., Disp: 15 tablet, Rfl: 0  •  MULTIPLE VITAMINS-CALCIUM PO, Take  by mouth., Disp: , Rfl:   •  pravastatin (PRAVACHOL) 40 MG tablet, TAKE 1 TABLET BY MOUTH EVERY NIGHT, Disp: 90 tablet, Rfl: 1  •  methylPREDNISolone (MEDROL) 4 MG dose pack, Take as directed on package instructions., Disp: 21 tablet, Rfl: 0     Objective     History of Present Illness     Review of Systems   Constitutional: Negative.    HENT: Negative.    Eyes: Negative.    Respiratory: Negative.    Cardiovascular: Negative.    Gastrointestinal: Negative.    Genitourinary: Negative.    Musculoskeletal: Positive for neck pain.   Skin: Negative.    Neurological: Negative.    Psychiatric/Behavioral: Negative.        Physical Exam  Vitals signs and nursing note reviewed.   Constitutional:       Appearance: Normal appearance.   HENT:      Head: Normocephalic and atraumatic.   Eyes:      Conjunctiva/sclera: Conjunctivae normal.   Neck:      Musculoskeletal: Neck rigidity and muscular tenderness present.   Cardiovascular:      Rate and Rhythm: Normal rate and regular rhythm.      Heart sounds: Normal heart sounds.   Pulmonary:      Effort: Pulmonary effort is normal. No respiratory distress.      Breath sounds: Normal breath sounds. No wheezing or rales.   Musculoskeletal:      Comments: Neck is tender to palpation especially on the left side and a sternocleidomastoid area.  Trapezius muscles did not seem to be tender.  Range of motion is limited especially turning to the left   Skin:     General: Skin is warm and dry.   Neurological:      General: No focal deficit present.      Mental Status: She is alert and oriented to person, place, and time.   Psychiatric:         Mood and Affect: Mood normal.         Behavior: Behavior normal.         ASSESSMENT #1-neck pain, especially on the left side that appears to be musculoskeletal with no radicular symptoms.  There is been no injury.     Problem List Items  Addressed This Visit        Nervous and Auditory    Neck pain, acute      Other Visit Diagnoses     Need for immunization against influenza    -  Primary    Relevant Orders    Fluarix/Fluzone/Afluria Quad>6 Months (Completed)          PLAN initially were going to continue the muscle relaxant and add in a Medrol Dosepak.  I advised the patient that I want to see her back in 10 days to 2 weeks if he is not continuing to resolve itself.  At that point we probably would need x-rays and physical therapy referral    There are no Patient Instructions on file for this visit.  No follow-ups on file.

## 2020-10-08 ENCOUNTER — TELEPHONE (OUTPATIENT)
Dept: FAMILY MEDICINE CLINIC | Facility: CLINIC | Age: 60
End: 2020-10-08

## 2020-10-08 NOTE — TELEPHONE ENCOUNTER
PATIENT WAS PRESCRIBED STEROIDS AND THEY WERE WORKING, AND NOW THAT SHE FINISHED THE PACK SHE IS STARTING TO HAVE PAIN AGAIN    PATIENT IS NOT SURE WHAT TO DO, IF SHE NEEDS ANOTHER DOSE OR WHAT SHOULD BE DONE    PLEASE CALL AND ADVISE [phone]

## 2020-10-08 NOTE — TELEPHONE ENCOUNTER
CALLED AND LEFT DETAILED MESSAGE FOR PATIENT ADVISING HER THAT IN DR. JACOBS'S OFFICE VISIT NOTE ON 09/30/2020 HE SAID THAT HE WANTED TO SEE HER BACK IF SHE WAS STILL HAVING PROBLEMS AND WOULD DISCUSS X-RAYS AND PHYSICAL THERAPY AT APPOINTMENT. ASKED PATIENT TO PLEASE CALL OUR OFFICE TO SCHEDULE AN APPT.

## 2020-10-28 ENCOUNTER — OFFICE VISIT (OUTPATIENT)
Dept: FAMILY MEDICINE CLINIC | Facility: CLINIC | Age: 60
End: 2020-10-28

## 2020-10-28 VITALS
HEIGHT: 64 IN | SYSTOLIC BLOOD PRESSURE: 102 MMHG | HEART RATE: 94 BPM | OXYGEN SATURATION: 99 % | RESPIRATION RATE: 16 BRPM | BODY MASS INDEX: 27.14 KG/M2 | TEMPERATURE: 97.5 F | WEIGHT: 159 LBS | DIASTOLIC BLOOD PRESSURE: 70 MMHG

## 2020-10-28 DIAGNOSIS — M54.2 NECK PAIN, ACUTE: Primary | ICD-10-CM

## 2020-10-28 PROCEDURE — 99213 OFFICE O/P EST LOW 20 MIN: CPT | Performed by: INTERNAL MEDICINE

## 2020-10-28 RX ORDER — CYCLOBENZAPRINE HCL 10 MG
10 TABLET ORAL 3 TIMES DAILY PRN
Qty: 30 TABLET | Refills: 0 | Status: SHIPPED | OUTPATIENT
Start: 2020-10-28

## 2020-10-28 NOTE — PROGRESS NOTES
Subjective   Estephania Gimenez is a 60 y.o. female. Patient is here today for follow-up on her left neck pain.  At the last visit I got her a steroid pack and some muscle relaxant.  She did improve but then has had a slight flare recently.  She has difficulty turning the head to the left.  The muscle relaxant does help her sleep.  She is not having any radicular symptoms and has had no trauma.    Chief Complaint   Patient presents with   • Neck Pain     PT HERE FOR FOLLOW UP ON HER NECK PAIN          Vitals:    10/28/20 0816   BP: 102/70   Pulse: 94   Resp: 16   Temp: 97.5 °F (36.4 °C)   SpO2: 99%     Body mass index is 27.28 kg/m².  The following portions of the patient's history were reviewed and updated as appropriate: allergies, current medications, past family history, past medical history, past social history, past surgical history and problem list.    Past Medical History:   Diagnosis Date   • Anxiety    • Cellulitis    • Hyperlipidemia       No Known Allergies   Social History     Socioeconomic History   • Marital status:      Spouse name: Not on file   • Number of children: Not on file   • Years of education: Not on file   • Highest education level: Not on file   Tobacco Use   • Smoking status: Former Smoker   • Smokeless tobacco: Never Used   Substance and Sexual Activity   • Alcohol use: Yes   • Drug use: No   • Sexual activity: Yes     Partners: Male     Birth control/protection: Surgical        Current Outpatient Medications:   •  aspirin 81 MG tablet, Take  by mouth., Disp: , Rfl:   •  Calcium Citrate-Vitamin D (CALCIUM CITRATE + PO), Take  by mouth., Disp: , Rfl:   •  citalopram (CeleXA) 40 MG tablet, Take 1 tablet by mouth Daily., Disp: 90 tablet, Rfl: 3  •  cyclobenzaprine (FLEXERIL) 10 MG tablet, Take 1 tablet by mouth 3 (Three) Times a Day As Needed for Muscle Spasms (neck pain.)., Disp: 30 tablet, Rfl: 0  •  MULTIPLE VITAMINS-CALCIUM PO, Take  by mouth., Disp: , Rfl:   •  pravastatin  (PRAVACHOL) 40 MG tablet, TAKE 1 TABLET BY MOUTH EVERY NIGHT, Disp: 90 tablet, Rfl: 1     Objective     History of Present Illness     Review of Systems   Constitutional: Negative.    HENT: Negative.    Respiratory: Negative.    Cardiovascular: Negative.    Gastrointestinal: Negative.    Genitourinary: Negative.    Musculoskeletal: Positive for neck pain.   Skin: Negative.    Neurological: Negative.    Psychiatric/Behavioral: Negative.        Physical Exam  Vitals signs and nursing note reviewed.   Constitutional:       Appearance: Normal appearance.   HENT:      Head: Normocephalic and atraumatic.   Eyes:      General: No scleral icterus.     Conjunctiva/sclera: Conjunctivae normal.   Neck:      Comments: Patient has some mild tenderness at the left occipital area and decreased range of motion turning the head to the left  Cardiovascular:      Rate and Rhythm: Normal rate and regular rhythm.      Heart sounds: Normal heart sounds.   Pulmonary:      Effort: Pulmonary effort is normal. No respiratory distress.      Breath sounds: Normal breath sounds. No wheezing or rales.   Skin:     General: Skin is warm and dry.   Neurological:      General: No focal deficit present.      Mental Status: She is alert and oriented to person, place, and time.   Psychiatric:         Mood and Affect: Mood normal.         Behavior: Behavior normal.         ASSESSMENT #1-continuing neck pain, especially the left with no radicular symptoms     Problems Addressed this Visit        Nervous and Auditory    Neck pain, acute - Primary    Relevant Orders    Ambulatory Referral to Physical Therapy Evaluate and treat      Diagnoses       Codes Comments    Neck pain, acute    -  Primary ICD-10-CM: M54.2  ICD-9-CM: 723.1           PLAN I am refilling the patient's cyclobenzaprine and I would like to try her with physical therapy and then recheck her in 3 to 4 weeks to see how that is coming along.  I do not believe an MRI is indicated yet    There  are no Patient Instructions on file for this visit.  Return in about 1 month (around 11/28/2020) for Recheck.

## 2020-11-16 ENCOUNTER — TREATMENT (OUTPATIENT)
Dept: PHYSICAL THERAPY | Facility: CLINIC | Age: 60
End: 2020-11-16

## 2020-11-16 DIAGNOSIS — S16.1XXD STRAIN OF NECK MUSCLE, SUBSEQUENT ENCOUNTER: Primary | ICD-10-CM

## 2020-11-16 PROCEDURE — 97161 PT EVAL LOW COMPLEX 20 MIN: CPT | Performed by: PHYSICAL THERAPIST

## 2020-11-16 PROCEDURE — 97110 THERAPEUTIC EXERCISES: CPT | Performed by: PHYSICAL THERAPIST

## 2020-11-16 PROCEDURE — 97140 MANUAL THERAPY 1/> REGIONS: CPT | Performed by: PHYSICAL THERAPIST

## 2020-11-16 PROCEDURE — 97530 THERAPEUTIC ACTIVITIES: CPT | Performed by: PHYSICAL THERAPIST

## 2020-11-16 NOTE — PROGRESS NOTES
Physical Therapy Initial Evaluation and Plan of Care    Patient: Estephania Gimenez   : 1960  Diagnosis/ICD-10 Code:  No primary diagnosis found.  Referring practitioner: Shon Joaquin MD    Subjective Evaluation    History of Present Illness  Mechanism of injury: Labor Day week she awakened with some cervical pain after sleeping on a different pillow.  Pain progressed to the point that she could not lift her head easily.  Was prescribed muscle relaxers, some better but worsened when she started to work out again.   Still cant turn her head to the left>right.  Does Cross Fit, swims, does some other weight liftiing  No longer taking any meds regularly - ibu occasionally       Patient Occupation: Account Mgr - computer work Pain  Current pain ratin  At best pain ratin  At worst pain ratin  Location: Left>right cervical region, upper trap, no UE symptoms. no weakness , no NT  Quality: dull ache, discomfort and tight  Relieving factors: medications, rest and heat  Aggravating factors: lifting, movement, overhead activity and outstretched reach  Progression: improved    Hand dominance: right    Diagnostic Tests  No diagnostic tests performed    Treatments  Previous treatment: medication  Current treatment: physical therapy  Patient Goals  Patient goal: Be back to [place]. go back to the gym without pain           Objective        Special Questions  Patient is experiencing headaches.     Additional Special Questions  Occasional headaches - posterior      Postural Observations  Seated posture: fair    Additional Postural Observation Details  Slight FHP sitting and standing    Palpation   Left   No palpable tenderness to the upper trapezius.   Hypertonic in the scalenes and upper trapezius.   Tenderness of the scalenes.     Right   No palpable tenderness to the scalenes.   Hypertonic in the scalenes and upper trapezius.     Tenderness   Cervical Spine   Tenderness in the spinous process.   No  tenderness in the left 1st rib.     Left Shoulder   No tenderness in the clavicle.     Additional Tenderness Details  Tenderness at the C4 spinous process    Neurological Testing     Sensation   Cervical/Thoracic   Left   Intact: light touch    Right   Intact: light touch    Active Range of Motion   Cervical/Thoracic Spine   Cervical    Flexion: 36 degrees   Extension: 40 degrees with pain  Left lateral flexion: 34 degrees with pain  Right lateral flexion: 30 degrees with pain  Left rotation: 43 degrees with pain  Right rotation: 40 degrees with pain    Strength/Myotome Testing   Cervical Spine     Right   Normal strength    Left Elbow   Flexion: 4+    Left Wrist/Hand   Wrist extension: 4    Tests   Cervical     Left   Positive cervical distraction.   Negative brachial plexus traction.     Right   Positive cervical distraction.   Negative brachial plexus traction.           Assessment & Plan     Assessment  Impairments: abnormal muscle firing, abnormal muscle tone, abnormal or restricted ROM, activity intolerance, impaired physical strength, lacks appropriate home exercise program and pain with function  Assessment details: 60 y.o. female with left>right cervical pain of insidious onset presents with: 1. Constant cervical pain, 2. Decreased cervical AROM, 3. Increased muscle tone and tenderness in left >right posterior scalene muscle, upper trapezius and levator scapular muscle, 4. No signs of abnormal neural involvement, 5. Poor work ergonomic set up at home office, 6. Decreased tolerance for many normal  ADL's to include getting back to the gym for work outs  Prognosis: good  Functional Limitations: carrying objects, lifting, uncomfortable because of pain, moving in bed and reaching overhead  Goals  Plan Goals: Short Term Goals: 2 weeks  Patient will be able to tolerate initial exercises  Patient will have pain <5/10  Patient will be able to sleep without pain interruption  Patient will be able to sit and work at  her desk for 1 hour without pain    Long Term Goals: 4 weeks  Patient will be independent in performing home exercise program.  Patient will have functional pain free AROM  Patient will be able to turn head comfortably in bilateral rotation to drive  Patient will return to regular work outs at the gym    Plan  Planned modality interventions: ultrasound and dry needling  Planned therapy interventions: manual therapy, soft tissue mobilization, spinal/joint mobilization, strengthening, stretching, home exercise program, postural training and therapeutic activities  Frequency: 2x week  Duration in visits: 8  Duration in weeks: 4  Treatment plan discussed with: patient  Plan details: Patient issued written HEP of exercises performed in clinic today  Lengthy discussion of home work station ergonomic set up - discussed seating, keyboard and monitor height, etc        Manual Therapy:    20     mins  14244;  Therapeutic Exercise:    10     mins  36307;     Neuromuscular Rik:    0    mins  33965;    Therapeutic Activity:     10     mins  85696;       Evaluation Time:     20  mins  Timed Treatment:   40   mins   Total Treatment:     65   mins    PT SIGNATURE: Sandrine Mcdonough, PT   DATE TREATMENT INITIATED: 11/16/2020    Initial Certification  Certification Period: 2/14/2021  I certify that the therapy services are furnished while this patient is under my care.  The services outlined above are required by this patient, and will be reviewed every 90 days.     PHYSICIAN: Shon Joaquin MD      DATE:     Please sign and return via fax to [phone].. Thank you, [facility].

## 2020-11-24 ENCOUNTER — TREATMENT (OUTPATIENT)
Dept: PHYSICAL THERAPY | Facility: CLINIC | Age: 60
End: 2020-11-24

## 2020-11-24 DIAGNOSIS — S16.1XXD STRAIN OF NECK MUSCLE, SUBSEQUENT ENCOUNTER: Primary | ICD-10-CM

## 2020-11-24 PROCEDURE — 97140 MANUAL THERAPY 1/> REGIONS: CPT | Performed by: PHYSICAL THERAPIST

## 2020-11-24 PROCEDURE — 97012 MECHANICAL TRACTION THERAPY: CPT | Performed by: PHYSICAL THERAPIST

## 2020-11-24 PROCEDURE — 97110 THERAPEUTIC EXERCISES: CPT | Performed by: PHYSICAL THERAPIST

## 2020-11-24 NOTE — PROGRESS NOTES
Physical Therapy Daily Progress Note    Patient: Estephania Gimenez   : 1960  Diagnosis/ICD-10 Code:  Strain of neck muscle, subsequent encounter [S16.1XXD]  Referring practitioner: Shon Joaquin MD  Date of Initial Visit: Type: THERAPY  Noted: 2020  Today's Date: 2020  Patient seen for 2 sessions           Subjective Definitely some improvements from last session. 3-4/10 pain. Have been doing HEP. I still can't sleep on my stomach like I used to. Have adjusted my work setting.     Objective   See Exercise, Manual, and Modality Logs for complete treatment.   Encouraged pt to perform stretches for both L and R sides.   Education regarding postural dysfunction, importance of flexibility and strength. Also discussed pillow and bed mobility (to ideally avoid prone sleeping but prop pillows to avoid pure prone on cervical spine).     Assessment/Plan Pt reporting decreased pain after last session, continued manual and postural work and also added mechanical cervical traction. Will benefit from continued postural strengthening and manual work for muscle relaxation and joint mobility.            Timed:    Manual Therapy:    17 mins  63330;  Therapeutic Exercise:   11    mins  45941;     Neuromuscular Rik:        mins  86914;    Therapeutic Activity:          mins  49608;     Gait Training:           mins  95447;     Ultrasound:          mins  62638;    Electrical Stimulation:         mins  05353 ( );  Iontophoresis         mins 69653;  Aquatic Therapy         mins 05932;  Dry Needling                   mins    Untimed:  Electrical Stimulation:         mins  15205 ( );  Mechanical Traction:    12     mins  90877;     Timed Treatment:   28   mins   Total Treatment:     40   mins  Glo Cano, PT  Physical Therapist

## 2020-12-01 ENCOUNTER — TREATMENT (OUTPATIENT)
Dept: PHYSICAL THERAPY | Facility: CLINIC | Age: 60
End: 2020-12-01

## 2020-12-01 DIAGNOSIS — S16.1XXD STRAIN OF NECK MUSCLE, SUBSEQUENT ENCOUNTER: Primary | ICD-10-CM

## 2020-12-01 PROCEDURE — 97012 MECHANICAL TRACTION THERAPY: CPT | Performed by: PHYSICAL THERAPIST

## 2020-12-01 PROCEDURE — 97140 MANUAL THERAPY 1/> REGIONS: CPT | Performed by: PHYSICAL THERAPIST

## 2020-12-01 PROCEDURE — 97110 THERAPEUTIC EXERCISES: CPT | Performed by: PHYSICAL THERAPIST

## 2020-12-01 NOTE — PROGRESS NOTES
Physical Therapy Daily Progress Note    VISIT#: 3    Subjective   Estephania Gimenez reports: that she has aggravated her neck muscles a bit lifting boxes for decorations.  Still feeling much better than she had been at her initial visit.       Objective   Restricted left rotation>right rotation     Increased tone in the posterior scalene muscles     See Exercise, Manual, and Modality Logs for complete treatment.     Patient Education: corrected stretching     Assessment/Plan  Pain has decreased but still has limited cervical moition.  NO abnormal neural involvement.     Progress strengthening /stabilization /functional activity           Manual Therapy:    20     mins  29742;  Therapeutic Exercise:    15     mins  77592;     Neuromuscular Rik:    0    mins  91220;    Therapeutic Activity:     0     mins  87286;     Dry Needling                  0_  mins    Timed Treatment:   35   mins   Total Treatment:     50   mins    Sandrine Mcdonough, PT  [place] License # [id number]  Physical Therapist

## 2020-12-03 ENCOUNTER — TREATMENT (OUTPATIENT)
Dept: PHYSICAL THERAPY | Facility: CLINIC | Age: 60
End: 2020-12-03

## 2020-12-03 DIAGNOSIS — S16.1XXD STRAIN OF NECK MUSCLE, SUBSEQUENT ENCOUNTER: Primary | ICD-10-CM

## 2020-12-03 PROCEDURE — 97110 THERAPEUTIC EXERCISES: CPT | Performed by: PHYSICAL THERAPIST

## 2020-12-03 PROCEDURE — 97035 APP MDLTY 1+ULTRASOUND EA 15: CPT | Performed by: PHYSICAL THERAPIST

## 2020-12-03 PROCEDURE — 97140 MANUAL THERAPY 1/> REGIONS: CPT | Performed by: PHYSICAL THERAPIST

## 2020-12-03 PROCEDURE — 97012 MECHANICAL TRACTION THERAPY: CPT | Performed by: PHYSICAL THERAPIST

## 2020-12-03 NOTE — PROGRESS NOTES
Physical Therapy Daily Progress Note    VISIT#: 4    Subjective   Estephania Gimenez reports: that she is frustrated that she is still having tightness and pain in the neck.  States that she has modified her work station at home.  Complaining of tightness into cervical rotation       Objective   Increased tone in posterior scalene muscles limiting lateral flexion and rotation    See Exercise, Manual, and Modality Logs for complete treatment.     Patient Education: consistent stretching    Assessment/Plan  Patient with persistent tightness in the neck but does have decrease in pain with therapy.  Patinet to be out of town for 2 weeks.  If symptoms persist upon return to [facility] will call to schedule therapy and start Dry Needling.    Consider Dry Needling if still symptomatic upon return to [facility]           Manual Therapy:    20     mins  99994;  Therapeutic Exercise:    10     mins  06955;     Neuromuscular Rik:    0    mins  92825;    Therapeutic Activity:     0     mins  89715;     Dry Needling                  0_  mins    Timed Treatment:   38   mins   Total Treatment:     65   mins    Sandrine Mcdonough, PT  [place] License # [id number]  Physical Therapist

## 2020-12-22 ENCOUNTER — TELEPHONE (OUTPATIENT)
Dept: FAMILY MEDICINE CLINIC | Facility: CLINIC | Age: 60
End: 2020-12-22

## 2020-12-22 NOTE — TELEPHONE ENCOUNTER
Caller: Estephania Gimenez    Relationship to patient: Self    Best call back number: [phone] (LEAVE VM IF NO ANSWER)    Concerns or Questions if Applicable: MS. GIMENEZ SAYS SHE WAS EXPOSED TO SOMEONE TESTED POSITIVE ON 12/21/2020, SHE IS WANTING TO KNOW IF SHE SHOULD BE TESTED. NO SYMPTOMS

## 2020-12-22 NOTE — TELEPHONE ENCOUNTER
CALLED AND S/W PATIENT AND ADVISED THAT THEY CAN BE TESTED IF THEY WOULD LIKE. SHOULD WAIT ABOUT 5 DAYS AFTER EXPOSURE TO BE TESTED. SHE VOICED UNDERSTANDING.

## 2021-01-26 DIAGNOSIS — E78.5 HYPERLIPIDEMIA, UNSPECIFIED HYPERLIPIDEMIA TYPE: ICD-10-CM

## 2021-01-26 DIAGNOSIS — R79.89 ELEVATED TSH: ICD-10-CM

## 2021-02-01 ENCOUNTER — APPOINTMENT (OUTPATIENT)
Dept: WOMENS IMAGING | Facility: HOSPITAL | Age: 61
End: 2021-02-01

## 2021-02-01 PROCEDURE — 77067 SCR MAMMO BI INCL CAD: CPT | Performed by: RADIOLOGY

## 2021-02-01 PROCEDURE — 77063 BREAST TOMOSYNTHESIS BI: CPT | Performed by: RADIOLOGY

## 2021-02-02 ENCOUNTER — TELEPHONE (OUTPATIENT)
Dept: FAMILY MEDICINE CLINIC | Facility: CLINIC | Age: 61
End: 2021-02-02

## 2021-02-02 RX ORDER — CITALOPRAM 40 MG/1
40 TABLET ORAL DAILY
Qty: 90 TABLET | Refills: 3 | Status: SHIPPED | OUTPATIENT
Start: 2021-02-02 | End: 2022-02-25

## 2021-02-02 NOTE — TELEPHONE ENCOUNTER
[facility] form to add on free t4 has been sent to [facility].     ----- Message from Shon Joaquin MD sent at 2/2/2021 10:29 AM EST -----  See if they can add in her free T4 to the labs she just had drawn.  Her TSH was elevated  ----- Message -----  From: Interface, [facility] Results In  Sent: 2/1/2021   8:08 PM EST  To: Shon Joaquin MD

## 2021-02-09 ENCOUNTER — OFFICE VISIT (OUTPATIENT)
Dept: FAMILY MEDICINE CLINIC | Facility: CLINIC | Age: 61
End: 2021-02-09

## 2021-02-09 VITALS
TEMPERATURE: 96.6 F | HEIGHT: 64 IN | OXYGEN SATURATION: 97 % | RESPIRATION RATE: 18 BRPM | SYSTOLIC BLOOD PRESSURE: 110 MMHG | DIASTOLIC BLOOD PRESSURE: 76 MMHG | HEART RATE: 93 BPM | WEIGHT: 160.4 LBS | BODY MASS INDEX: 27.39 KG/M2

## 2021-02-09 DIAGNOSIS — J30.89 ENVIRONMENTAL AND SEASONAL ALLERGIES: Primary | ICD-10-CM

## 2021-02-09 DIAGNOSIS — R79.89 TSH ELEVATION: ICD-10-CM

## 2021-02-09 DIAGNOSIS — F41.9 ANXIETY: ICD-10-CM

## 2021-02-09 DIAGNOSIS — R73.9 HYPERGLYCEMIA: ICD-10-CM

## 2021-02-09 DIAGNOSIS — E78.5 HYPERLIPIDEMIA, UNSPECIFIED HYPERLIPIDEMIA TYPE: ICD-10-CM

## 2021-02-09 PROCEDURE — 99214 OFFICE O/P EST MOD 30 MIN: CPT | Performed by: INTERNAL MEDICINE

## 2021-02-09 RX ORDER — PRAVASTATIN SODIUM 40 MG
40 TABLET ORAL NIGHTLY
Qty: 90 TABLET | Refills: 3 | Status: SHIPPED | OUTPATIENT
Start: 2021-02-09 | End: 2021-03-02

## 2021-02-09 NOTE — PROGRESS NOTES
Subjective   Estephania Gimenez is a 60 y.o. female. Patient is here today for follow-up on her hyperlipidemia, hyperglycemia and TSH elevation.  Patient's been feeling well and has no acute complaints.  She has some mild fatigue but it is not very severe.  Chief Complaint   Patient presents with   • Hyperlipidemia     FU LABS   • Hyperglycemia          Vitals:    02/09/21 0921   BP: 110/76   Pulse: 93   Resp: 18   Temp: 96.6 °F (35.9 °C)   SpO2: 97%     Body mass index is 27.52 kg/m².  The following portions of the patient's history were reviewed and updated as appropriate: allergies, current medications, past family history, past medical history, past social history, past surgical history and problem list.    Past Medical History:   Diagnosis Date   • Anxiety    • Cellulitis    • Hyperlipidemia       No Known Allergies   Social History     Socioeconomic History   • Marital status:      Spouse name: Not on file   • Number of children: Not on file   • Years of education: Not on file   • Highest education level: Not on file   Tobacco Use   • Smoking status: Former Smoker   • Smokeless tobacco: Never Used   Substance and Sexual Activity   • Alcohol use: Yes   • Drug use: No   • Sexual activity: Yes     Partners: Male     Birth control/protection: Surgical        Current Outpatient Medications:   •  aspirin 81 MG tablet, Take  by mouth., Disp: , Rfl:   •  Calcium Citrate-Vitamin D (CALCIUM CITRATE + PO), Take  by mouth., Disp: , Rfl:   •  citalopram (CeleXA) 40 MG tablet, TAKE 1 TABLET BY MOUTH DAILY, Disp: 90 tablet, Rfl: 3  •  cyclobenzaprine (FLEXERIL) 10 MG tablet, Take 1 tablet by mouth 3 (Three) Times a Day As Needed for Muscle Spasms (neck pain.)., Disp: 30 tablet, Rfl: 0  •  MULTIPLE VITAMINS-CALCIUM PO, Take  by mouth., Disp: , Rfl:   •  pravastatin (PRAVACHOL) 40 MG tablet, Take 1 tablet by mouth Every Night., Disp: 90 tablet, Rfl: 3     Objective     History of Present Illness     Review of Systems    Constitutional: Negative.    HENT: Negative.    Respiratory: Negative.    Cardiovascular: Negative.    Gastrointestinal: Negative.    Genitourinary: Negative.    Musculoskeletal: Negative.    Skin: Negative.    Neurological: Negative.    Hematological: Negative.    Psychiatric/Behavioral: Negative.        Physical Exam  Vitals signs and nursing note reviewed.   Constitutional:       General: She is not in acute distress.     Appearance: Normal appearance. She is not ill-appearing.   Eyes:      General: No scleral icterus.     Conjunctiva/sclera: Conjunctivae normal.   Neck:      Musculoskeletal: Normal range of motion and neck supple.   Cardiovascular:      Rate and Rhythm: Normal rate and regular rhythm.      Heart sounds: Normal heart sounds.   Pulmonary:      Effort: Pulmonary effort is normal. No respiratory distress.      Breath sounds: Normal breath sounds. No wheezing or rales.   Musculoskeletal: Normal range of motion.   Skin:     General: Skin is warm and dry.   Neurological:      General: No focal deficit present.      Mental Status: She is alert and oriented to person, place, and time.   Psychiatric:         Mood and Affect: Mood normal.         Behavior: Behavior normal.         ASSESSMENT CMP was essentially normal.  Lipid panel is a bit higher than usual with total cholesterol 170, HDL 42 and .  Vitamin D level is normal.  TSH was mildly elevated at 5.69 and free T4 was slightly low at 0.81 but clinically the patient seems euthyroid  #1-hyperlipidemia, controlled on medication  #2-hyperglycemia with normal sugar today  #3-anxiety, stable on Celexa  #4-elevated TSH and low free T4 but clinically euthyroid     Problems Addressed this Visit        Allergies and Adverse Reactions    Environmental and seasonal allergies - Primary       Cardiac and Vasculature    Hyperlipidemia    Relevant Medications    pravastatin (PRAVACHOL) 40 MG tablet       Endocrine and Metabolic    Hyperglycemia       Diagnoses       Codes Comments    Environmental and seasonal allergies    -  Primary ICD-10-CM: J30.89  ICD-9-CM: 477.8     Hyperlipidemia, unspecified hyperlipidemia type     ICD-10-CM: E78.5  ICD-9-CM: 272.4     Hyperglycemia     ICD-10-CM: R73.9  ICD-9-CM: 790.29           PLAN the patient will continue current medicines as now.  I would like to recheck her in 6 months with a CBC, CMP, lipid panel, hemoglobin A1c, TSH and free T4    There are no Patient Instructions on file for this visit.  Return in about 6 months (around 8/9/2021) for with labs.

## 2021-02-23 ENCOUNTER — OFFICE VISIT (OUTPATIENT)
Dept: OBSTETRICS AND GYNECOLOGY | Facility: CLINIC | Age: 61
End: 2021-02-23

## 2021-02-23 VITALS
DIASTOLIC BLOOD PRESSURE: 79 MMHG | HEIGHT: 64 IN | WEIGHT: 162 LBS | BODY MASS INDEX: 27.66 KG/M2 | HEART RATE: 76 BPM | SYSTOLIC BLOOD PRESSURE: 114 MMHG

## 2021-02-23 DIAGNOSIS — Z01.419 ENCOUNTER FOR GYNECOLOGICAL EXAMINATION WITHOUT ABNORMAL FINDING: Primary | ICD-10-CM

## 2021-02-23 PROCEDURE — 99396 PREV VISIT EST AGE 40-64: CPT | Performed by: OBSTETRICS & GYNECOLOGY

## 2021-02-23 NOTE — PROGRESS NOTES
GYN Annual Exam     CC- Here for annual exam.     Estephania Gimenez is a 60 y.o. female who presents for annual well woman exam. Periods are absent due to Menopause.     OB History        1    Para   1    Term   1            AB        Living   1       SAB        TAB        Ectopic        Molar        Multiple        Live Births   1                Current contraception: post menopausal status  History of abnormal Pap smear: yes - no treatment required  Family history of uterine, colon or ovarian cancer: yes - colon in uncle   History of abnormal mammogram: no  Family history of breast cancer: yes - Aunts   Last Pap : 2018 NL HPV neg  Last mammogram: 2021  Last colonoscopy: scheduled for 2021  Last DEXA: 20 - Osteopenia - Low FRAX   Parental Hip Fracture: No    Past Medical History:   Diagnosis Date   • Anxiety    • Cellulitis    • Hyperlipidemia        Past Surgical History:   Procedure Laterality Date   • COLONOSCOPY     • TUBAL ABDOMINAL LIGATION     • WISDOM TOOTH EXTRACTION           Current Outpatient Medications:   •  aspirin 81 MG tablet, Take  by mouth., Disp: , Rfl:   •  Calcium Citrate-Vitamin D (CALCIUM CITRATE + PO), Take  by mouth., Disp: , Rfl:   •  citalopram (CeleXA) 40 MG tablet, TAKE 1 TABLET BY MOUTH DAILY, Disp: 90 tablet, Rfl: 3  •  cyclobenzaprine (FLEXERIL) 10 MG tablet, Take 1 tablet by mouth 3 (Three) Times a Day As Needed for Muscle Spasms (neck pain.)., Disp: 30 tablet, Rfl: 0  •  MULTIPLE VITAMINS-CALCIUM PO, Take  by mouth., Disp: , Rfl:   •  pravastatin (PRAVACHOL) 40 MG tablet, Take 1 tablet by mouth Every Night., Disp: 90 tablet, Rfl: 3    No Known Allergies    Social History     Tobacco Use   • Smoking status: Former Smoker   • Smokeless tobacco: Never Used   Substance Use Topics   • Alcohol use: Yes   • Drug use: No       Family History   Problem Relation Age of Onset   • No Known Problems Mother    • No Known Problems Father    • Cancer Maternal  "Aunt    • Breast cancer Maternal Aunt         over 60   • Breast cancer Paternal Aunt         over 60   • Throat cancer Paternal Uncle    • Heart disease Maternal Grandmother    • Colon cancer Paternal Uncle    • Heart disease Paternal Uncle    • Heart disease Paternal Uncle    • Ovarian cancer Neg Hx    • Uterine cancer Neg Hx    • Deep vein thrombosis Neg Hx    • Pulmonary embolism Neg Hx        Review of Systems   Constitutional: Negative for chills, fever and unexpected weight loss.   Gastrointestinal: Negative for abdominal pain.   Genitourinary: Negative for dysuria, pelvic pain, vaginal bleeding and vaginal discharge.   All other systems reviewed and are negative.      /79   Pulse 76   Ht 162.6 cm (64\")   Wt 73.5 kg (162 lb)   Breastfeeding No   BMI 27.81 kg/m²     Physical Exam  Constitutional:       General: She is not in acute distress.     Appearance: She is well-developed and normal weight.   Genitourinary:      Pelvic exam was performed with patient supine.      Vulva, urethra, bladder, vagina, right adnexa, left adnexa and rectum normal.      No vulval lesion or Bartholin's cyst noted.      No posterior fourchette lesion present.      No inguinal adenopathy present in the right or left side.     Vaginal atrophy present.      No vaginal discharge or bleeding.      No cervical motion tenderness or friability.      Uterus is not enlarged or tender.      No uterine mass detected.     Uterus is anteverted and regular.      Right and left adnexa are non-palpable.   Rectum:      No rectal mass or abnormal anal tone.   HENT:      Head: Normocephalic and atraumatic.   Eyes:      Conjunctiva/sclera: Conjunctivae normal.      Pupils: Pupils are equal, round, and reactive to light.   Neck:      Musculoskeletal: Normal range of motion and neck supple.      Thyroid: No thyromegaly.   Cardiovascular:      Rate and Rhythm: Normal rate and regular rhythm.      Heart sounds: Normal heart sounds. No murmur. "   Pulmonary:      Effort: Pulmonary effort is normal. No respiratory distress.      Breath sounds: Normal breath sounds.   Chest:      Breasts:         Right: No inverted nipple, mass or nipple discharge.         Left: No inverted nipple, mass or nipple discharge.   Abdominal:      General: Abdomen is flat. There is no distension.      Palpations: Abdomen is soft.      Tenderness: There is no abdominal tenderness.   Musculoskeletal: Normal range of motion.         General: No deformity.   Lymphadenopathy:      Lower Body: No right inguinal adenopathy. No left inguinal adenopathy.   Neurological:      Mental Status: She is alert and oriented to person, place, and time.   Skin:     General: Skin is warm and dry.      Findings: No erythema.   Psychiatric:         Behavior: Behavior normal.   Vitals signs reviewed. Exam conducted with a chaperone present.             Assessment     1) GYN annual well woman exam.        Plan     1) Breast Health - Clinical breast exam & mammogram reviewed specifically American [employer] recommendations for screening specific to her, and Self breast awareness monthly  2) Pap - updated today   3) Smoking status- non-smoker   4) Colon health - screening colonoscopy Scheduled  5) Bone health - Weight bearing exercise, dietary calcium recommendations and vitamin D reviewed.   DEXA/FRAX low and up to date   6) Encouraged to be wary of information obtained via social media and internet based on source and search.   7) Follow up prn and one year      Robbin Mullen MD   2/23/2021  11:50 EST

## 2021-02-25 LAB
CYTOLOGIST CVX/VAG CYTO: NORMAL
CYTOLOGY CVX/VAG DOC CYTO: NORMAL
CYTOLOGY CVX/VAG DOC THIN PREP: NORMAL
DX ICD CODE: NORMAL
HIV 1 & 2 AB SER-IMP: NORMAL
HPV I/H RISK 4 DNA CVX QL PROBE+SIG AMP: NEGATIVE
OTHER STN SPEC: NORMAL
STAT OF ADQ CVX/VAG CYTO-IMP: NORMAL

## 2021-03-02 RX ORDER — PRAVASTATIN SODIUM 40 MG
40 TABLET ORAL NIGHTLY
Qty: 90 TABLET | Refills: 3 | Status: SHIPPED | OUTPATIENT
Start: 2021-03-02 | End: 2022-02-11 | Stop reason: SDUPTHER

## 2021-03-22 ENCOUNTER — BULK ORDERING (OUTPATIENT)
Dept: CASE MANAGEMENT | Facility: OTHER | Age: 61
End: 2021-03-22

## 2021-03-22 DIAGNOSIS — Z23 IMMUNIZATION DUE: ICD-10-CM

## 2021-04-02 ENCOUNTER — IMMUNIZATION (OUTPATIENT)
Dept: VACCINE CLINIC | Facility: HOSPITAL | Age: 61
End: 2021-04-02

## 2021-04-02 DIAGNOSIS — Z23 IMMUNIZATION DUE: ICD-10-CM

## 2021-04-02 PROCEDURE — 0001A: CPT | Performed by: INTERNAL MEDICINE

## 2021-04-02 PROCEDURE — 91300 HC SARSCOV02 VAC 30MCG/0.3ML IM: CPT | Performed by: INTERNAL MEDICINE

## 2021-04-23 ENCOUNTER — APPOINTMENT (OUTPATIENT)
Dept: VACCINE CLINIC | Facility: HOSPITAL | Age: 61
End: 2021-04-23

## 2021-04-26 ENCOUNTER — IMMUNIZATION (OUTPATIENT)
Dept: VACCINE CLINIC | Facility: HOSPITAL | Age: 61
End: 2021-04-26

## 2021-04-26 PROCEDURE — 0002A: CPT | Performed by: INTERNAL MEDICINE

## 2021-04-26 PROCEDURE — 91300 HC SARSCOV02 VAC 30MCG/0.3ML IM: CPT | Performed by: INTERNAL MEDICINE

## 2021-08-23 DIAGNOSIS — R73.9 HYPERGLYCEMIA: ICD-10-CM

## 2021-08-23 DIAGNOSIS — E78.5 HYPERLIPIDEMIA, UNSPECIFIED HYPERLIPIDEMIA TYPE: ICD-10-CM

## 2021-08-23 DIAGNOSIS — R79.89 TSH ELEVATION: ICD-10-CM

## 2021-08-25 LAB
ALBUMIN SERPL-MCNC: 4.5 G/DL (ref 3.5–5.2)
ALBUMIN/GLOB SERPL: 1.6 G/DL
ALP SERPL-CCNC: 84 U/L (ref 39–117)
ALT SERPL-CCNC: 19 U/L (ref 1–33)
AST SERPL-CCNC: 21 U/L (ref 1–32)
BASOPHILS # BLD AUTO: 0.02 10*3/MM3 (ref 0–0.2)
BASOPHILS NFR BLD AUTO: 0.6 % (ref 0–1.5)
BILIRUB SERPL-MCNC: 0.3 MG/DL (ref 0–1.2)
BUN SERPL-MCNC: 17 MG/DL (ref 8–23)
BUN/CREAT SERPL: 25.8 (ref 7–25)
CALCIUM SERPL-MCNC: 9.9 MG/DL (ref 8.6–10.5)
CHLORIDE SERPL-SCNC: 106 MMOL/L (ref 98–107)
CHOLEST SERPL-MCNC: 181 MG/DL (ref 0–200)
CO2 SERPL-SCNC: 28.7 MMOL/L (ref 22–29)
CREAT SERPL-MCNC: 0.66 MG/DL (ref 0.57–1)
EOSINOPHIL # BLD AUTO: 0.1 10*3/MM3 (ref 0–0.4)
EOSINOPHIL NFR BLD AUTO: 3.1 % (ref 0.3–6.2)
ERYTHROCYTE [DISTWIDTH] IN BLOOD BY AUTOMATED COUNT: 12.7 % (ref 12.3–15.4)
GLOBULIN SER CALC-MCNC: 2.9 GM/DL
GLUCOSE SERPL-MCNC: 102 MG/DL (ref 65–99)
HBA1C MFR BLD: 5.6 % (ref 4.8–5.6)
HCT VFR BLD AUTO: 40.3 % (ref 34–46.6)
HDLC SERPL-MCNC: 40 MG/DL (ref 40–60)
HGB BLD-MCNC: 13 G/DL (ref 12–15.9)
IMM GRANULOCYTES # BLD AUTO: 0.01 10*3/MM3 (ref 0–0.05)
IMM GRANULOCYTES NFR BLD AUTO: 0.3 % (ref 0–0.5)
LDLC SERPL CALC-MCNC: 117 MG/DL (ref 0–100)
LDLC/HDLC SERPL: 2.85 {RATIO}
LYMPHOCYTES # BLD AUTO: 1.39 10*3/MM3 (ref 0.7–3.1)
LYMPHOCYTES NFR BLD AUTO: 43.3 % (ref 19.6–45.3)
MCH RBC QN AUTO: 30.7 PG (ref 26.6–33)
MCHC RBC AUTO-ENTMCNC: 32.3 G/DL (ref 31.5–35.7)
MCV RBC AUTO: 95 FL (ref 79–97)
MONOCYTES # BLD AUTO: 0.32 10*3/MM3 (ref 0.1–0.9)
MONOCYTES NFR BLD AUTO: 10 % (ref 5–12)
NEUTROPHILS # BLD AUTO: 1.37 10*3/MM3 (ref 1.7–7)
NEUTROPHILS NFR BLD AUTO: 42.7 % (ref 42.7–76)
NRBC BLD AUTO-RTO: 0 /100 WBC (ref 0–0.2)
PLATELET # BLD AUTO: 263 10*3/MM3 (ref 140–450)
POTASSIUM SERPL-SCNC: 4.4 MMOL/L (ref 3.5–5.2)
PROT SERPL-MCNC: 7.4 G/DL (ref 6–8.5)
RBC # BLD AUTO: 4.24 10*6/MM3 (ref 3.77–5.28)
SODIUM SERPL-SCNC: 142 MMOL/L (ref 136–145)
T4 FREE SERPL-MCNC: 0.85 NG/DL (ref 0.93–1.7)
TRIGL SERPL-MCNC: 135 MG/DL (ref 0–150)
TSH SERPL DL<=0.005 MIU/L-ACNC: 6.29 UIU/ML (ref 0.27–4.2)
VLDLC SERPL CALC-MCNC: 24 MG/DL (ref 5–40)
WBC # BLD AUTO: 3.21 10*3/MM3 (ref 3.4–10.8)

## 2021-08-31 ENCOUNTER — OFFICE VISIT (OUTPATIENT)
Dept: FAMILY MEDICINE CLINIC | Facility: CLINIC | Age: 61
End: 2021-08-31

## 2021-08-31 VITALS
SYSTOLIC BLOOD PRESSURE: 110 MMHG | BODY MASS INDEX: 27.18 KG/M2 | WEIGHT: 159.2 LBS | OXYGEN SATURATION: 98 % | HEIGHT: 64 IN | TEMPERATURE: 97.6 F | DIASTOLIC BLOOD PRESSURE: 80 MMHG | RESPIRATION RATE: 18 BRPM | HEART RATE: 76 BPM

## 2021-08-31 DIAGNOSIS — F41.9 ANXIETY: ICD-10-CM

## 2021-08-31 DIAGNOSIS — E78.5 HYPERLIPIDEMIA, UNSPECIFIED HYPERLIPIDEMIA TYPE: ICD-10-CM

## 2021-08-31 DIAGNOSIS — R73.9 HYPERGLYCEMIA: ICD-10-CM

## 2021-08-31 DIAGNOSIS — R79.89 TSH ELEVATION: ICD-10-CM

## 2021-08-31 DIAGNOSIS — J30.89 ENVIRONMENTAL AND SEASONAL ALLERGIES: Primary | ICD-10-CM

## 2021-08-31 PROCEDURE — 99214 OFFICE O/P EST MOD 30 MIN: CPT | Performed by: INTERNAL MEDICINE

## 2021-08-31 NOTE — PROGRESS NOTES
Subjective   Estephania Gimenez is a 61 y.o. female. Patient is here today for follow-up on her allergies, hyperlipidemia, hyperglycemia, anxiety and TSH elevation.  Patient generally feels well and has no acute complaints.  Energy level seems reasonable.    Chief Complaint   Patient presents with   • Hyperlipidemia     6mo fu lab fu          Vitals:    08/31/21 0801   BP: 110/80   Pulse: 76   Resp: 18   Temp: 97.6 °F (36.4 °C)   SpO2: 98%     Body mass index is 27.31 kg/m².  The following portions of the patient's history were reviewed and updated as appropriate: allergies, current medications, past family history, past medical history, past social history, past surgical history and problem list.    Past Medical History:   Diagnosis Date   • Anxiety    • Cellulitis    • Hyperlipidemia       No Known Allergies   Social History     Socioeconomic History   • Marital status:      Spouse name: Not on file   • Number of children: Not on file   • Years of education: Not on file   • Highest education level: Not on file   Tobacco Use   • Smoking status: Former Smoker   • Smokeless tobacco: Never Used   Substance and Sexual Activity   • Alcohol use: Yes   • Drug use: No   • Sexual activity: Yes     Partners: Male     Birth control/protection: Surgical        Current Outpatient Medications:   •  aspirin 81 MG tablet, Take  by mouth., Disp: , Rfl:   •  Calcium Citrate-Vitamin D (CALCIUM CITRATE + PO), Take  by mouth., Disp: , Rfl:   •  citalopram (CeleXA) 40 MG tablet, TAKE 1 TABLET BY MOUTH DAILY, Disp: 90 tablet, Rfl: 3  •  cyclobenzaprine (FLEXERIL) 10 MG tablet, Take 1 tablet by mouth 3 (Three) Times a Day As Needed for Muscle Spasms (neck pain.)., Disp: 30 tablet, Rfl: 0  •  MULTIPLE VITAMINS-CALCIUM PO, Take  by mouth., Disp: , Rfl:   •  pravastatin (PRAVACHOL) 40 MG tablet, TAKE 1 TABLET BY MOUTH EVERY NIGHT, Disp: 90 tablet, Rfl: 3     Objective     History of Present Illness     Review of Systems    Constitutional: Negative.  Negative for fatigue.   Respiratory: Negative.    Cardiovascular: Negative.    Gastrointestinal: Negative.    Genitourinary: Negative.    Neurological: Negative.    Psychiatric/Behavioral: Negative.        Physical Exam  Vitals and nursing note reviewed.   Constitutional:       General: She is not in acute distress.     Appearance: Normal appearance. She is not ill-appearing.   Neck:      Comments: No thyromegaly  Cardiovascular:      Rate and Rhythm: Normal rate and regular rhythm.      Heart sounds: Normal heart sounds.   Pulmonary:      Effort: Pulmonary effort is normal. No respiratory distress.      Breath sounds: Normal breath sounds. No wheezing, rhonchi or rales.   Musculoskeletal:         General: Normal range of motion.      Cervical back: Normal range of motion and neck supple.   Skin:     General: Skin is warm and dry.   Neurological:      General: No focal deficit present.      Mental Status: She is alert and oriented to person, place, and time.   Psychiatric:         Mood and Affect: Mood normal.         Behavior: Behavior normal.         ASSESSMENT CBC has a minimally low white cell count of 3200 and is otherwise normal.  CMP has a sugar of 102 and was otherwise normal and hemoglobin A1c was normal at 5.6.  Lipid panel has total cholesterol 181, HDL 40,  and is stable.  TSH was minimally high at 6.29 and free T4 is a bit low at 1.85 but clinically the patient's euthyroid  #1-hyperlipidemia, stable on medication  #2-hyperglycemia with stable sugar and normal hemoglobin A1c, diet controlled  #3-anxiety, stable on citalopram  #4-borderline hypothyroidism, clinically still euthyroid     Problems Addressed this Visit        Allergies and Adverse Reactions    Environmental and seasonal allergies - Primary       Cardiac and Vasculature    Hyperlipidemia       Endocrine and Metabolic    Hyperglycemia       Mental Health    Anxiety       Symptoms and Signs    TSH elevation       Diagnoses       Codes Comments    Environmental and seasonal allergies    -  Primary ICD-10-CM: J30.89  ICD-9-CM: 477.8     Hyperlipidemia, unspecified hyperlipidemia type     ICD-10-CM: E78.5  ICD-9-CM: 272.4     Hyperglycemia     ICD-10-CM: R73.9  ICD-9-CM: 790.29     Anxiety     ICD-10-CM: F41.9  ICD-9-CM: 300.00     TSH elevation     ICD-10-CM: R79.89  ICD-9-CM: 794.5           PLAN I recommended a flu shot in October and the Covid booster in December.  The patient will continue current medicines and has refills.  I plan on rechecking her in 6 months for complete physical exam including a CBC, CMP, lipid panel, hemoglobin A1c, TSH and free T4, vitamin D level and urinalysis    There are no Patient Instructions on file for this visit.  Return in about 6 months (around 2/28/2022) for with labs.

## 2021-09-07 ENCOUNTER — TELEPHONE (OUTPATIENT)
Dept: FAMILY MEDICINE CLINIC | Facility: CLINIC | Age: 61
End: 2021-09-07

## 2021-09-07 NOTE — TELEPHONE ENCOUNTER
PATIENT CALLED TO CHECK WITH DR DENG IF HE IS GOING TO PRESCRIBE THYROID MEDICATION  PLEASE ADVISE    PREFERRED PHARMACY     [id number] - [place], [place] - [address] AT [facility] - [phone]  - [phone] FX

## 2021-10-25 ENCOUNTER — TELEPHONE (OUTPATIENT)
Dept: FAMILY MEDICINE CLINIC | Facility: CLINIC | Age: 61
End: 2021-10-25

## 2021-10-25 NOTE — TELEPHONE ENCOUNTER
PT CALLED IN STATING SHE WAS RETURN VIDA'S CALL.  PT ADVISED SHE WAS OUT OF TOWN.  I DID NOT SEE ANY CURRENT MESSAGES FROM OFFICE.  LAST TELEPHONE ENCOUNTER 9/7/21?    PLEASE CONTACT PT -[phone]

## 2021-12-14 ENCOUNTER — TELEPHONE (OUTPATIENT)
Dept: FAMILY MEDICINE CLINIC | Facility: CLINIC | Age: 61
End: 2021-12-14

## 2022-02-04 DIAGNOSIS — R79.89 TSH ELEVATION: ICD-10-CM

## 2022-02-04 DIAGNOSIS — R73.9 HYPERGLYCEMIA: ICD-10-CM

## 2022-02-04 DIAGNOSIS — Z00.00 ROUTINE HEALTH MAINTENANCE: ICD-10-CM

## 2022-02-09 LAB
ALBUMIN SERPL-MCNC: 4.5 G/DL (ref 3.8–4.8)
ALBUMIN/GLOB SERPL: 1.6 {RATIO} (ref 1.2–2.2)
ALP SERPL-CCNC: 94 IU/L (ref 44–121)
ALT SERPL-CCNC: 17 IU/L (ref 0–32)
APPEARANCE UR: CLEAR
AST SERPL-CCNC: 16 IU/L (ref 0–40)
BACTERIA #/AREA URNS HPF: NORMAL /[HPF]
BASOPHILS # BLD AUTO: 0 X10E3/UL (ref 0–0.2)
BASOPHILS NFR BLD AUTO: 1 %
BILIRUB SERPL-MCNC: 0.4 MG/DL (ref 0–1.2)
BILIRUB UR QL STRIP: NEGATIVE
BUN SERPL-MCNC: 15 MG/DL (ref 8–27)
BUN/CREAT SERPL: 25 (ref 12–28)
CALCIUM SERPL-MCNC: 9.6 MG/DL (ref 8.7–10.3)
CASTS URNS QL MICRO: NORMAL /LPF
CHLORIDE SERPL-SCNC: 100 MMOL/L (ref 96–106)
CHOLEST SERPL-MCNC: 192 MG/DL (ref 100–199)
CO2 SERPL-SCNC: 26 MMOL/L (ref 20–29)
COLOR UR: YELLOW
CREAT SERPL-MCNC: 0.59 MG/DL (ref 0.57–1)
EOSINOPHIL # BLD AUTO: 0.1 X10E3/UL (ref 0–0.4)
EOSINOPHIL NFR BLD AUTO: 3 %
EPI CELLS #/AREA URNS HPF: NORMAL /HPF (ref 0–10)
ERYTHROCYTE [DISTWIDTH] IN BLOOD BY AUTOMATED COUNT: 12.5 % (ref 11.7–15.4)
GLOBULIN SER CALC-MCNC: 2.8 G/DL (ref 1.5–4.5)
GLUCOSE SERPL-MCNC: 94 MG/DL (ref 65–99)
GLUCOSE UR QL STRIP: NEGATIVE
HBA1C MFR BLD: 5.7 % (ref 4.8–5.6)
HCT VFR BLD AUTO: 37.7 % (ref 34–46.6)
HDLC SERPL-MCNC: 47 MG/DL
HGB BLD-MCNC: 12.5 G/DL (ref 11.1–15.9)
HGB UR QL STRIP: NEGATIVE
IMM GRANULOCYTES # BLD AUTO: 0 X10E3/UL (ref 0–0.1)
IMM GRANULOCYTES NFR BLD AUTO: 0 %
KETONES UR QL STRIP: NEGATIVE
LDLC SERPL CALC-MCNC: 121 MG/DL (ref 0–99)
LDLC/HDLC SERPL: 2.6 RATIO (ref 0–3.2)
LEUKOCYTE ESTERASE UR QL STRIP: ABNORMAL
LYMPHOCYTES # BLD AUTO: 1.4 X10E3/UL (ref 0.7–3.1)
LYMPHOCYTES NFR BLD AUTO: 36 %
MCH RBC QN AUTO: 30.7 PG (ref 26.6–33)
MCHC RBC AUTO-ENTMCNC: 33.2 G/DL (ref 31.5–35.7)
MCV RBC AUTO: 93 FL (ref 79–97)
MICRO URNS: ABNORMAL
MONOCYTES # BLD AUTO: 0.4 X10E3/UL (ref 0.1–0.9)
MONOCYTES NFR BLD AUTO: 10 %
NEUTROPHILS # BLD AUTO: 1.9 X10E3/UL (ref 1.4–7)
NEUTROPHILS NFR BLD AUTO: 50 %
NITRITE UR QL STRIP: NEGATIVE
PH UR STRIP: 7.5 [PH] (ref 5–7.5)
PLATELET # BLD AUTO: 266 X10E3/UL (ref 150–450)
POTASSIUM SERPL-SCNC: 4.6 MMOL/L (ref 3.5–5.2)
PROT SERPL-MCNC: 7.3 G/DL (ref 6–8.5)
PROT UR QL STRIP: NEGATIVE
RBC # BLD AUTO: 4.07 X10E6/UL (ref 3.77–5.28)
RBC #/AREA URNS HPF: NORMAL /HPF (ref 0–2)
SODIUM SERPL-SCNC: 141 MMOL/L (ref 134–144)
SP GR UR STRIP: 1.02 (ref 1–1.03)
T4 FREE SERPL-MCNC: 0.9 NG/DL (ref 0.82–1.77)
TRIGL SERPL-MCNC: 135 MG/DL (ref 0–149)
TSH SERPL DL<=0.005 MIU/L-ACNC: 3.68 UIU/ML (ref 0.45–4.5)
UROBILINOGEN UR STRIP-MCNC: 1 MG/DL (ref 0.2–1)
VLDLC SERPL CALC-MCNC: 24 MG/DL (ref 5–40)
WBC # BLD AUTO: 3.9 X10E3/UL (ref 3.4–10.8)
WBC #/AREA URNS HPF: NORMAL /HPF (ref 0–5)

## 2022-02-11 ENCOUNTER — OFFICE VISIT (OUTPATIENT)
Dept: FAMILY MEDICINE CLINIC | Facility: CLINIC | Age: 62
End: 2022-02-11

## 2022-02-11 VITALS
HEIGHT: 64 IN | OXYGEN SATURATION: 100 % | SYSTOLIC BLOOD PRESSURE: 126 MMHG | DIASTOLIC BLOOD PRESSURE: 80 MMHG | WEIGHT: 151 LBS | HEART RATE: 65 BPM | TEMPERATURE: 96.9 F | RESPIRATION RATE: 18 BRPM | BODY MASS INDEX: 25.78 KG/M2

## 2022-02-11 DIAGNOSIS — Z00.00 ENCOUNTER FOR PREVENTIVE CARE: ICD-10-CM

## 2022-02-11 DIAGNOSIS — F41.9 ANXIETY: ICD-10-CM

## 2022-02-11 DIAGNOSIS — K21.9 GASTROESOPHAGEAL REFLUX DISEASE, UNSPECIFIED WHETHER ESOPHAGITIS PRESENT: ICD-10-CM

## 2022-02-11 DIAGNOSIS — E78.5 HYPERLIPIDEMIA, UNSPECIFIED HYPERLIPIDEMIA TYPE: Primary | ICD-10-CM

## 2022-02-11 PROCEDURE — 99396 PREV VISIT EST AGE 40-64: CPT | Performed by: STUDENT IN AN ORGANIZED HEALTH CARE EDUCATION/TRAINING PROGRAM

## 2022-02-11 RX ORDER — PANTOPRAZOLE SODIUM 40 MG/1
40 TABLET, DELAYED RELEASE ORAL DAILY PRN
Qty: 30 TABLET | Refills: 0
Start: 2022-02-11

## 2022-02-11 RX ORDER — ATORVASTATIN CALCIUM 10 MG/1
10 TABLET, FILM COATED ORAL DAILY
Qty: 90 TABLET | Refills: 3 | Status: SHIPPED | OUTPATIENT
Start: 2022-02-11 | End: 2023-03-10

## 2022-02-14 NOTE — PROGRESS NOTES
"Chief Complaint  Annual Exam (physical)    Subjective          Estephania Gimenez presents to [facility]  For annual physical examination, lab follow-up.  Patient denies having any complaint.  Patient does walking and exercise on daily basis.  Review of system is negative for fever, headache, chest pain, shortness of breath, palpitation, nausea, vomiting, any recent change in bladder habits.        Objective   Vital Signs:   /80 (BP Location: Left arm, Patient Position: Sitting)   Pulse 65   Temp 96.9 °F (36.1 °C) (Oral)   Resp 18   Ht 162.6 cm (64.02\")   Wt 68.5 kg (151 lb)   SpO2 100%   BMI 25.91 kg/m²     Physical Exam  HENT:      Head: Normocephalic and atraumatic.      Mouth/Throat:      Mouth: Mucous membranes are moist.      Pharynx: Oropharynx is clear.   Eyes:      Extraocular Movements: Extraocular movements intact.      Conjunctiva/sclera: Conjunctivae normal.      Pupils: Pupils are equal, round, and reactive to light.   Cardiovascular:      Rate and Rhythm: Normal rate and regular rhythm.   Pulmonary:      Effort: Pulmonary effort is normal.      Breath sounds: Normal breath sounds.   Abdominal:      General: Bowel sounds are normal.      Palpations: Abdomen is soft.   Musculoskeletal:         General: Normal range of motion.      Cervical back: Neck supple.   Skin:     General: Skin is warm.      Capillary Refill: Capillary refill takes less than 2 seconds.   Neurological:      General: No focal deficit present.      Mental Status: She is alert and oriented to person, place, and time. Mental status is at baseline.   Psychiatric:         Mood and Affect: Mood normal.        Result Review :     CMP    CMP 8/24/21 2/8/22   Glucose 102 (A) 94   BUN 17 15   Creatinine 0.66 0.59   eGFR Non  Am 90 98   eGFR African Am 110 113   Sodium 142 141   Potassium 4.4 4.6   Chloride 106 100   Calcium 9.9 9.6   Total Protein 7.4 7.3   Albumin 4.50 4.5   Globulin 2.9 2.8 "   Total Bilirubin 0.3 0.4   Alkaline Phosphatase 84 94   AST (SGOT) 21 16   ALT (SGPT) 19 17   (A) Abnormal value       Comments are available for some flowsheets but are not being displayed.           CBC    CBC 8/24/21 2/8/22   WBC 3.21 (A) 3.9   RBC 4.24 4.07   Hemoglobin 13.0 12.5   Hematocrit 40.3 37.7   MCV 95.0 93   MCH 30.7 30.7   MCHC 32.3 33.2   RDW 12.7 12.5   Platelets 263 266   (A) Abnormal value            Lipid Panel    Lipid Panel 8/24/21 2/8/22   Total Cholesterol 181 192   Triglycerides 135 135   HDL Cholesterol 40 47   VLDL Cholesterol 24 24   LDL Cholesterol  117 (A) 121 (A)   LDL/HDL Ratio 2.85 2.6   (A) Abnormal value       Comments are available for some flowsheets but are not being displayed.           TSH    TSH 8/24/21 2/8/22   TSH 6.290 (A) 3.680   (A) Abnormal value            Most Recent A1C    HGBA1C Most Recent 2/8/22   Hemoglobin A1C 5.7 (A)   (A) Abnormal value       Comments are available for some flowsheets but are not being displayed.           UA    Urinalysis 2/8/22 2/8/22    1012 1012   Blood, UA Negative    Leukocytes, UA Trace (A)    Nitrite, UA Negative    RBC, UA  0-2   Bacteria, UA  None seen   (A) Abnormal value                      Assessment and Plan    Diagnoses and all orders for this visit:    1. Hyperlipidemia, unspecified hyperlipidemia type (Primary)  Comments:  was on pravastatin, c/o heart burn, would like to switch to other drug, changed to lipitor 10 mg PO daily.  Orders:  -     atorvastatin (Lipitor) 10 MG tablet; Take 1 tablet by mouth Daily.  Dispense: 90 tablet; Refill: 3  -     pantoprazole (PROTONIX) 40 MG EC tablet; Take 1 tablet by mouth Daily As Needed (heart burn).  Dispense: 30 tablet; Refill: 0    2. Anxiety  Comments:  controlled, on celexa 40 mg Po daily    3. Gastroesophageal reflux disease, unspecified whether esophagitis present  Comments:  pantoprazole 40 mg PO daily PRN for heart burn    4. Encounter for preventive care  Comments:  PAP smear  schedules feb25, Mammogram scheduled next week, Doing Q1yr, last PAPA and mammo were completely normal, colonoscopy april2021, recommend in 5 yr        Follow Up   Return in about 6 months (around 8/11/2022).  Patient was given instructions and counseling regarding her condition or for health maintenance advice. Please see specific information pulled into the AVS if appropriate.

## 2022-02-15 ENCOUNTER — APPOINTMENT (OUTPATIENT)
Dept: WOMENS IMAGING | Facility: HOSPITAL | Age: 62
End: 2022-02-15

## 2022-02-15 PROCEDURE — 77067 SCR MAMMO BI INCL CAD: CPT | Performed by: RADIOLOGY

## 2022-02-15 PROCEDURE — 77063 BREAST TOMOSYNTHESIS BI: CPT | Performed by: RADIOLOGY

## 2022-02-25 ENCOUNTER — OFFICE VISIT (OUTPATIENT)
Dept: OBSTETRICS AND GYNECOLOGY | Facility: CLINIC | Age: 62
End: 2022-02-25

## 2022-02-25 VITALS
BODY MASS INDEX: 26.29 KG/M2 | DIASTOLIC BLOOD PRESSURE: 81 MMHG | WEIGHT: 154 LBS | SYSTOLIC BLOOD PRESSURE: 122 MMHG | HEIGHT: 64 IN | HEART RATE: 73 BPM

## 2022-02-25 DIAGNOSIS — Z01.419 ENCOUNTER FOR GYNECOLOGICAL EXAMINATION WITHOUT ABNORMAL FINDING: Primary | ICD-10-CM

## 2022-02-25 PROCEDURE — 99396 PREV VISIT EST AGE 40-64: CPT | Performed by: OBSTETRICS & GYNECOLOGY

## 2022-02-25 RX ORDER — CITALOPRAM 40 MG/1
40 TABLET ORAL DAILY
Qty: 90 TABLET | Refills: 3 | Status: SHIPPED | OUTPATIENT
Start: 2022-02-25

## 2022-02-25 NOTE — PROGRESS NOTES
GYN Annual Exam     CC- Here for annual exam.     Estephania Gimenez is a 61 y.o. female who presents for annual well woman exam. Periods are absent due to Menopause.     OB History        1    Para   1    Term   1            AB        Living   1       SAB        IAB        Ectopic        Molar        Multiple        Live Births   1                Current contraception: post menopausal status  History of abnormal Pap smear: yes - no treatment required  Family history of uterine, colon or ovarian cancer: yes - colon cancer uncle   History of abnormal mammogram: no  Family history of breast cancer: yes - aunts   Last Pap : 2021 NL HPV neg   Last mammogram: 02/15/2022  Last colonoscopy: years ago  Last DEXA: 2020 Osteopenic hip   Parental Hip Fracture: No    Past Medical History:   Diagnosis Date   • Anxiety    • Cellulitis    • Hyperlipidemia        Past Surgical History:   Procedure Laterality Date   • COLONOSCOPY     • TUBAL ABDOMINAL LIGATION     • WISDOM TOOTH EXTRACTION           Current Outpatient Medications:   •  aspirin 81 MG tablet, Take  by mouth., Disp: , Rfl:   •  atorvastatin (Lipitor) 10 MG tablet, Take 1 tablet by mouth Daily., Disp: 90 tablet, Rfl: 3  •  Calcium Citrate-Vitamin D (CALCIUM CITRATE + PO), Take  by mouth., Disp: , Rfl:   •  citalopram (CeleXA) 40 MG tablet, TAKE 1 TABLET BY MOUTH DAILY, Disp: 90 tablet, Rfl: 3  •  cyclobenzaprine (FLEXERIL) 10 MG tablet, Take 1 tablet by mouth 3 (Three) Times a Day As Needed for Muscle Spasms (neck pain.)., Disp: 30 tablet, Rfl: 0  •  MULTIPLE VITAMINS-CALCIUM PO, Take  by mouth., Disp: , Rfl:   •  pantoprazole (PROTONIX) 40 MG EC tablet, Take 1 tablet by mouth Daily As Needed (heart burn)., Disp: 30 tablet, Rfl: 0    No Known Allergies    Social History     Tobacco Use   • Smoking status: Former Smoker   • Smokeless tobacco: Never Used   Substance Use Topics   • Alcohol use: Yes   • Drug use: No       Family History   Problem  "Relation Age of Onset   • No Known Problems Mother    • No Known Problems Father    • Cancer Maternal Aunt    • Breast cancer Maternal Aunt         over 60   • Breast cancer Paternal Aunt         over 60   • Throat cancer Paternal Uncle    • Heart disease Maternal Grandmother    • Colon cancer Paternal Uncle    • Heart disease Paternal Uncle    • Heart disease Paternal Uncle    • Ovarian cancer Neg Hx    • Uterine cancer Neg Hx    • Deep vein thrombosis Neg Hx    • Pulmonary embolism Neg Hx        Review of Systems   Constitutional: Negative for chills, fever and unexpected weight loss.   Gastrointestinal: Negative for abdominal pain.   Genitourinary: Negative for dysuria, pelvic pain, vaginal bleeding and vaginal discharge.   All other systems reviewed and are negative.      /81   Pulse 73   Ht 162.6 cm (64\")   Wt 69.9 kg (154 lb)   Breastfeeding No   BMI 26.43 kg/m²     Physical Exam  Constitutional:       General: She is not in acute distress.     Appearance: She is well-developed and normal weight.   Genitourinary:      Vulva, bladder, rectum and urethral meatus normal.      Right Labia: No lesions or Bartholin's cyst.     Left Labia: No lesions or Bartholin's cyst.     No inguinal adenopathy present in the right or left side.     No vaginal discharge or bleeding.      No vaginal prolapse present.     Mild vaginal atrophy present.       Right Adnexa: not tender, not full and no mass present.     Left Adnexa: not tender, not full and no mass present.     No cervical motion tenderness or friability.      No parametrium thickening present.     Uterus is not enlarged or tender.      No uterine mass detected.  Rectum:      No rectal mass or abnormal anal tone.   Breasts:      Right: No inverted nipple, mass or nipple discharge.      Left: No inverted nipple, mass or nipple discharge.       HENT:      Head: Normocephalic and atraumatic.   Eyes:      Conjunctiva/sclera: Conjunctivae normal.      Pupils: " Pupils are equal, round, and reactive to light.   Neck:      Thyroid: No thyromegaly.   Cardiovascular:      Rate and Rhythm: Normal rate and regular rhythm.      Heart sounds: Normal heart sounds. No murmur heard.      Pulmonary:      Effort: Pulmonary effort is normal. No respiratory distress.      Breath sounds: Normal breath sounds.   Abdominal:      General: Abdomen is flat. There is no distension.      Palpations: Abdomen is soft.      Tenderness: There is no abdominal tenderness.   Musculoskeletal:         General: No deformity. Normal range of motion.      Cervical back: Normal range of motion and neck supple.   Lymphadenopathy:      Lower Body: No right inguinal adenopathy. No left inguinal adenopathy.   Neurological:      Mental Status: She is alert and oriented to person, place, and time.   Skin:     General: Skin is warm and dry.      Findings: No erythema.   Psychiatric:         Behavior: Behavior normal.   Vitals reviewed. Exam conducted with a chaperone present.             Assessment     Diagnoses and all orders for this visit:    1. Encounter for gynecological examination without abnormal finding (Primary)    Expectations reviewed.      Plan     1) Breast Health - Clinical breast exam & mammogram reviewed specifically American Cancer Society recommendations for screening specific to her, and Self breast awareness monthly  2) Pap - up to date   3) Smoking status- non-smoker   4) Colon health - screening colonoscopy up to date  5) Bone health - Weight bearing exercise, dietary calcium recommendations and vitamin D reviewed.   Normal DEXA in July, 2020   6) Encouraged to be wary of information obtained via social media and internet based on source and search.   7) Follow up prn and one year      Robbin Mullen MD   2/25/2022  11:41 EST

## 2022-05-25 ENCOUNTER — TELEPHONE (OUTPATIENT)
Dept: FAMILY MEDICINE CLINIC | Facility: CLINIC | Age: 62
End: 2022-05-25

## 2022-05-25 NOTE — TELEPHONE ENCOUNTER
She should repeat the COVID testing down the road and certainly as she gets closer to the trip.  She should be negative before the trip.  I do not really know what happens if she develops COVID while on the trip.  That would be up to what ever country she is in.

## 2022-05-25 NOTE — TELEPHONE ENCOUNTER
Caller: Estephania Gimenez    Relationship: Self    Best call back number: [phone]    What was the call regarding: PATIENT TESTED POS FOR COVID 1 WEEK AGO AND IS DUE TO GO OUT OF THE COUNTRY IN July. SHE IS WONDERING IF IT'S POSSIBLE TO STILL TEST POS WHEN SHE IS OUT OF THE COUNTRY AND NOT BEING ABLE TO RETURN HOME. PLEASE ADVISE    Do you require a callback: YES

## 2022-08-10 DIAGNOSIS — R79.89 TSH ELEVATION: ICD-10-CM

## 2022-08-10 DIAGNOSIS — E78.5 HYPERLIPIDEMIA, UNSPECIFIED HYPERLIPIDEMIA TYPE: Primary | ICD-10-CM

## 2022-08-10 DIAGNOSIS — R73.9 HYPERGLYCEMIA: ICD-10-CM

## 2022-08-24 ENCOUNTER — OFFICE VISIT (OUTPATIENT)
Dept: FAMILY MEDICINE CLINIC | Facility: CLINIC | Age: 62
End: 2022-08-24

## 2022-08-24 VITALS
OXYGEN SATURATION: 97 % | TEMPERATURE: 97.8 F | HEIGHT: 64 IN | SYSTOLIC BLOOD PRESSURE: 109 MMHG | HEART RATE: 78 BPM | BODY MASS INDEX: 26.05 KG/M2 | WEIGHT: 152.6 LBS | DIASTOLIC BLOOD PRESSURE: 78 MMHG

## 2022-08-24 DIAGNOSIS — R79.89 TSH ELEVATION: ICD-10-CM

## 2022-08-24 DIAGNOSIS — F41.9 ANXIETY: ICD-10-CM

## 2022-08-24 DIAGNOSIS — E78.5 HYPERLIPIDEMIA, UNSPECIFIED HYPERLIPIDEMIA TYPE: ICD-10-CM

## 2022-08-24 DIAGNOSIS — Z78.0 POST-MENOPAUSAL: Primary | ICD-10-CM

## 2022-08-24 DIAGNOSIS — J30.89 ENVIRONMENTAL AND SEASONAL ALLERGIES: ICD-10-CM

## 2022-08-24 DIAGNOSIS — R73.9 HYPERGLYCEMIA: ICD-10-CM

## 2022-08-24 PROCEDURE — 99214 OFFICE O/P EST MOD 30 MIN: CPT | Performed by: INTERNAL MEDICINE

## 2022-08-24 NOTE — PROGRESS NOTES
Subjective   Estephania Gimenez is a 62 y.o. female. Patient is here today for follow-up on her environmental and seasonal allergies which have been mild, hyperlipidemia, hyperglycemia, anxiety and TSH elevation.  She has been feeling fine and has no acute complaints and has had no medicine side effects     No chief complaint on file.         Vitals:    08/24/22 0859   BP: 109/78   Pulse: 78   Temp: 97.8 °F (36.6 °C)   SpO2: 97%     Body mass index is 26.18 kg/m².  The following portions of the patient's history were reviewed and updated as appropriate: allergies, current medications, past family history, past medical history, past social history, past surgical history and problem list.    Past Medical History:   Diagnosis Date   • Anxiety    • Cellulitis    • Hyperlipidemia       No Known Allergies   Social History     Socioeconomic History   • Marital status:    Tobacco Use   • Smoking status: Former Smoker   • Smokeless tobacco: Never Used   Substance and Sexual Activity   • Alcohol use: Yes   • Drug use: No   • Sexual activity: Yes     Partners: Male     Birth control/protection: Surgical        Current Outpatient Medications:   •  atorvastatin (Lipitor) 10 MG tablet, Take 1 tablet by mouth Daily., Disp: 90 tablet, Rfl: 3  •  Calcium Citrate-Vitamin D (CALCIUM CITRATE + PO), Take  by mouth., Disp: , Rfl:   •  citalopram (CeleXA) 40 MG tablet, TAKE 1 TABLET BY MOUTH DAILY, Disp: 90 tablet, Rfl: 3  •  MULTIPLE VITAMINS-CALCIUM PO, Take  by mouth., Disp: , Rfl:   •  pantoprazole (PROTONIX) 40 MG EC tablet, Take 1 tablet by mouth Daily As Needed (heart burn)., Disp: 30 tablet, Rfl: 0  •  cyclobenzaprine (FLEXERIL) 10 MG tablet, Take 1 tablet by mouth 3 (Three) Times a Day As Needed for Muscle Spasms (neck pain.)., Disp: 30 tablet, Rfl: 0     Objective     History of Present Illness     Review of Systems    Physical Exam  Vitals and nursing note reviewed.   Constitutional:       General: She is not in acute  distress.     Appearance: Normal appearance. She is not ill-appearing.   HENT:      Head: Normocephalic and atraumatic.   Cardiovascular:      Rate and Rhythm: Normal rate and regular rhythm.      Heart sounds: Normal heart sounds.   Pulmonary:      Effort: Pulmonary effort is normal. No respiratory distress.      Breath sounds: Normal breath sounds. No wheezing or rales.   Musculoskeletal:         General: Normal range of motion.   Skin:     General: Skin is warm and dry.   Neurological:      General: No focal deficit present.      Mental Status: She is alert and oriented to person, place, and time.   Psychiatric:         Mood and Affect: Mood normal.         Behavior: Behavior normal.         ASSESSMENT CBC had a white cell count of 2800 and was otherwise completely normal.  CMP was completely normal with a normal sugar and lipid panel was stable with total cholesterol 150, HDL 39, LDL 84.  Hemoglobin A1c is stable at 5.9.  TSH was just minimally high and free T4 was low normal and stable  #1-hyperlipidemia, controlled on medication  #2-hyperglycemia with normal sugar and stable acceptable hemoglobin A1c, diet controlled  #3-anxiety, stable on medication  #4-TSH elevation with normal free T4, clinically euthyroid  #5-environmental and seasonal allergies, mild and controlled by over-the-counter medication       Problems Addressed this Visit        Allergies and Adverse Reactions    Environmental and seasonal allergies       Cardiac and Vasculature    Hyperlipidemia       Endocrine and Metabolic    Hyperglycemia       Genitourinary and Reproductive     Post-menopausal - Primary    Relevant Orders    DEXA Bone Density Axial       Mental Health    Anxiety       Symptoms and Signs    TSH elevation      Diagnoses       Codes Comments    Post-menopausal    -  Primary ICD-10-CM: Z78.0  ICD-9-CM: V49.81     Environmental and seasonal allergies     ICD-10-CM: J30.89  ICD-9-CM: 477.8     Hyperlipidemia, unspecified  hyperlipidemia type     ICD-10-CM: E78.5  ICD-9-CM: 272.4     Hyperglycemia     ICD-10-CM: R73.9  ICD-9-CM: 790.29     Anxiety     ICD-10-CM: F41.9  ICD-9-CM: 300.00     TSH elevation     ICD-10-CM: R79.89  ICD-9-CM: 794.5           PLAN I ordered a bone density test on the patient as she is due.  I recommended a flu shot in October and a fourth COVID-19 vaccination when appropriate.  She will continue current medicines as now and continue to watch dietary carbs and sweets.  I plan on rechecking her in 6 months with laboratory studies      There are no Patient Instructions on file for this visit.  Return in about 6 months (around 2/24/2023) for with labs.

## 2022-09-19 ENCOUNTER — HOSPITAL ENCOUNTER (OUTPATIENT)
Dept: BONE DENSITY | Facility: HOSPITAL | Age: 62
Discharge: HOME | End: 2022-09-19
Admitting: INTERNAL MEDICINE

## 2022-09-19 DIAGNOSIS — Z78.0 POST-MENOPAUSAL: ICD-10-CM

## 2022-09-19 PROCEDURE — 77080 DXA BONE DENSITY AXIAL: CPT

## 2023-03-10 DIAGNOSIS — E78.5 HYPERLIPIDEMIA, UNSPECIFIED HYPERLIPIDEMIA TYPE: ICD-10-CM

## 2023-03-10 RX ORDER — ATORVASTATIN CALCIUM 10 MG/1
10 TABLET, FILM COATED ORAL DAILY
Qty: 90 TABLET | Refills: 3 | Status: SHIPPED | OUTPATIENT
Start: 2023-03-10

## 2023-04-25 RX ORDER — CITALOPRAM 40 MG/1
40 TABLET ORAL DAILY
Qty: 90 TABLET | Refills: 3 | Status: SHIPPED | OUTPATIENT
Start: 2023-04-25

## 2023-04-25 NOTE — TELEPHONE ENCOUNTER
Rx Refill Note  Requested Prescriptions      No prescriptions requested or ordered in this encounter      Last office visit with prescribing clinician: 8/24/2022   Last telemedicine visit with prescribing clinician: 5/5/2023   Next office visit with prescribing clinician: 5/10/2023                         Would you like a call back once the refill request has been completed: [] Yes [] No    If the office needs to give you a call back, can they leave a voicemail: [] Yes [] No    Faina García MA  04/25/23, 08:38 EDT

## 2023-05-10 ENCOUNTER — OFFICE VISIT (OUTPATIENT)
Dept: FAMILY MEDICINE CLINIC | Facility: CLINIC | Age: 63
End: 2023-05-10
Payer: COMMERCIAL

## 2023-05-10 VITALS
SYSTOLIC BLOOD PRESSURE: 120 MMHG | WEIGHT: 166 LBS | TEMPERATURE: 95.5 F | OXYGEN SATURATION: 100 % | RESPIRATION RATE: 15 BRPM | DIASTOLIC BLOOD PRESSURE: 80 MMHG | BODY MASS INDEX: 28.34 KG/M2 | HEIGHT: 64 IN | HEART RATE: 71 BPM

## 2023-05-10 DIAGNOSIS — F41.9 ANXIETY: ICD-10-CM

## 2023-05-10 DIAGNOSIS — J30.89 ENVIRONMENTAL AND SEASONAL ALLERGIES: Primary | ICD-10-CM

## 2023-05-10 DIAGNOSIS — E78.5 HYPERLIPIDEMIA, UNSPECIFIED HYPERLIPIDEMIA TYPE: ICD-10-CM

## 2023-05-10 DIAGNOSIS — R79.89 TSH ELEVATION: ICD-10-CM

## 2023-05-10 DIAGNOSIS — R73.9 HYPERGLYCEMIA: ICD-10-CM

## 2023-05-10 PROCEDURE — 99214 OFFICE O/P EST MOD 30 MIN: CPT | Performed by: INTERNAL MEDICINE

## 2023-05-10 NOTE — PROGRESS NOTES
Subjective   Estephania Gimenez is a 62 y.o. female. Patient is here today for follow-up on environmental and seasonal allergies which have been bad, hyperlipidemia, hyperglycemia and history of TSH elevation.  Patient's energy level is good.  She takes Allegra regularly but has not been using any Flonase spray.  She says her eyes are wet particularly bother her.  Chief Complaint   Patient presents with   • Hyperlipidemia          Vitals:    05/10/23 0854   BP: 120/80   Pulse: 71   Resp: 15   Temp: 95.5 °F (35.3 °C)   SpO2: 100%     Body mass index is 28.48 kg/m².  The following portions of the patient's history were reviewed and updated as appropriate: allergies, current medications, past family history, past medical history, past social history, past surgical history and problem list.    Past Medical History:   Diagnosis Date   • Anxiety    • Cellulitis    • Hyperlipidemia       No Known Allergies   Social History     Socioeconomic History   • Marital status:    Tobacco Use   • Smoking status: Former   • Smokeless tobacco: Never   Substance and Sexual Activity   • Alcohol use: Yes   • Drug use: No   • Sexual activity: Yes     Partners: Male     Birth control/protection: Surgical        Current Outpatient Medications:   •  atorvastatin (LIPITOR) 10 MG tablet, TAKE 1 TABLET BY MOUTH DAILY, Disp: 90 tablet, Rfl: 3  •  Calcium Citrate-Vitamin D (CALCIUM CITRATE + PO), Take  by mouth., Disp: , Rfl:   •  citalopram (CeleXA) 40 MG tablet, Take 1 tablet by mouth Daily., Disp: 90 tablet, Rfl: 3  •  MULTIPLE VITAMINS-CALCIUM PO, Take  by mouth., Disp: , Rfl:      Objective     History of Present Illness     Review of Systems    Physical Exam  Vitals and nursing note reviewed.   Constitutional:       General: She is not in acute distress.     Appearance: Normal appearance. She is not ill-appearing.   HENT:      Head: Normocephalic and atraumatic.   Cardiovascular:      Rate and Rhythm: Normal rate and regular rhythm.       Heart sounds: Normal heart sounds.   Pulmonary:      Effort: Pulmonary effort is normal. No respiratory distress.      Breath sounds: Normal breath sounds. No wheezing or rales.   Skin:     General: Skin is warm and dry.   Neurological:      General: No focal deficit present.      Mental Status: She is alert and oriented to person, place, and time.   Psychiatric:         Mood and Affect: Mood normal.         Behavior: Behavior normal.         Assessment    ASSESSMENT CBC was normal.  CMP also was normal and hemoglobin A1c has decreased to normal at 5.6.  Lipid panel has total cholesterol 155, HDL 40 and LDL 91.  TSH this time is normal at 3.14 and vitamin D level is normal on supplement at 36.5.  #1-environmental and seasonal allergies  #2-hyperlipidemia, controlled on medication  #3-hyperglycemia with normal sugar and hemoglobin A1c today, diet controlled  #4-history of TSH elevation with normal value today, clinically euthyroid  #5-anxiety, stable on Celexa    Problems Addressed this Visit        Allergies and Adverse Reactions    Environmental and seasonal allergies - Primary       Cardiac and Vasculature    Hyperlipidemia       Endocrine and Metabolic    Hyperglycemia       Mental Health    Anxiety       Symptoms and Signs    TSH elevation   Diagnoses       Codes Comments    Environmental and seasonal allergies    -  Primary ICD-10-CM: J30.89  ICD-9-CM: 477.8     Hyperlipidemia, unspecified hyperlipidemia type     ICD-10-CM: E78.5  ICD-9-CM: 272.4     Hyperglycemia     ICD-10-CM: R73.9  ICD-9-CM: 790.29     Anxiety     ICD-10-CM: F41.9  ICD-9-CM: 300.00     TSH elevation     ICD-10-CM: R79.89  ICD-9-CM: 794.5           PLAN I recommended the patient get a COVID-19 booster.  She will continue current medicines as now.  She also may try Flonase for her allergies.  I plan on rechecking her in 6 months with a CMP, hemoglobin A1c, lipid panel, TSH and free T4    There are no Patient Instructions on file for this  visit.  Return in about 6 months (around 11/10/2023) for with labs.

## 2023-05-19 ENCOUNTER — OFFICE VISIT (OUTPATIENT)
Dept: OBSTETRICS AND GYNECOLOGY | Facility: CLINIC | Age: 63
End: 2023-05-19
Payer: COMMERCIAL

## 2023-05-19 VITALS
WEIGHT: 164 LBS | HEIGHT: 64 IN | BODY MASS INDEX: 28 KG/M2 | SYSTOLIC BLOOD PRESSURE: 118 MMHG | HEART RATE: 76 BPM | DIASTOLIC BLOOD PRESSURE: 79 MMHG

## 2023-05-19 DIAGNOSIS — Z01.419 ENCOUNTER FOR GYNECOLOGICAL EXAMINATION WITHOUT ABNORMAL FINDING: Primary | ICD-10-CM

## 2023-05-19 NOTE — PROGRESS NOTES
GYN Annual Exam     CC- Here for annual exam.     Estephania Gimenez is a 62 y.o. female who presents for annual well woman exam. Periods are absent due to Menopause.    OB History        1    Para   1    Term   1            AB        Living   1       SAB        IAB        Ectopic        Molar        Multiple        Live Births   1                Current contraception: post menopausal status  History of abnormal Pap smear: no  Family history of uterine, colon or ovarian cancer: yes - colon - uncle.   History of abnormal mammogram: no  Family history of breast cancer: yes - Aunt x 2 (one maternal and one paternal)   Last Pap : 2021 NIL HPV neg  Last mammogram: 2023  Last colonoscopy: 2021  Last DEXA: 2022  Osteopenic  Parental Hip Fracture: No, but mom with Osteoporosis.     Past Medical History:   Diagnosis Date   • Anxiety    • Cellulitis    • Hyperlipidemia        Past Surgical History:   Procedure Laterality Date   • COLONOSCOPY     • TUBAL ABDOMINAL LIGATION     • WISDOM TOOTH EXTRACTION           Current Outpatient Medications:   •  atorvastatin (LIPITOR) 10 MG tablet, TAKE 1 TABLET BY MOUTH DAILY, Disp: 90 tablet, Rfl: 3  •  Calcium Citrate-Vitamin D (CALCIUM CITRATE + PO), Take  by mouth., Disp: , Rfl:   •  citalopram (CeleXA) 40 MG tablet, Take 1 tablet by mouth Daily., Disp: 90 tablet, Rfl: 3  •  MULTIPLE VITAMINS-CALCIUM PO, Take  by mouth., Disp: , Rfl:     No Known Allergies    Social History     Tobacco Use   • Smoking status: Former   • Smokeless tobacco: Never   Substance Use Topics   • Alcohol use: Yes   • Drug use: No       Family History   Problem Relation Age of Onset   • No Known Problems Mother    • No Known Problems Father    • Cancer Maternal Aunt    • Breast cancer Maternal Aunt         over 60   • Breast cancer Paternal Aunt         over 60   • Throat cancer Paternal Uncle    • Heart disease Maternal Grandmother    • Colon cancer Paternal Uncle    • Heart  "disease Paternal Uncle    • Heart disease Paternal Uncle    • Ovarian cancer Neg Hx    • Uterine cancer Neg Hx    • Deep vein thrombosis Neg Hx    • Pulmonary embolism Neg Hx        Review of Systems   Constitutional: Negative for chills, fever and unexpected weight loss.   Gastrointestinal: Negative for abdominal pain.   Genitourinary: Negative for dysuria, pelvic pain, vaginal bleeding and vaginal discharge.   All other systems reviewed and are negative.      /79   Pulse 76   Ht 162.6 cm (64\")   Wt 74.4 kg (164 lb)   BMI 28.15 kg/m²     Physical Exam  Constitutional:       General: She is not in acute distress.     Appearance: She is well-developed and normal weight.   Genitourinary:      Vulva, bladder, rectum and urethral meatus normal.      Right Labia: No lesions or Bartholin's cyst.     Left Labia: No lesions or Bartholin's cyst.     No inguinal adenopathy present in the right or left side.     No vaginal discharge or bleeding.      No vaginal prolapse present.     Mild vaginal atrophy present.       Right Adnexa: not tender, not full and no mass present.     Left Adnexa: not tender, not full and no mass present.     No cervical motion tenderness or friability.      No parametrium thickening present.     Uterus is not enlarged or tender.      No uterine mass detected.  Rectum:      No rectal mass or abnormal anal tone.   Breasts:     Right: No inverted nipple, mass or nipple discharge.      Left: No inverted nipple, mass or nipple discharge.   HENT:      Head: Normocephalic and atraumatic.   Eyes:      Conjunctiva/sclera: Conjunctivae normal.      Pupils: Pupils are equal, round, and reactive to light.   Neck:      Thyroid: No thyromegaly.   Cardiovascular:      Rate and Rhythm: Normal rate and regular rhythm.      Heart sounds: Normal heart sounds. No murmur heard.  Pulmonary:      Effort: Pulmonary effort is normal. No respiratory distress.      Breath sounds: Normal breath sounds.   Abdominal:    "   General: Abdomen is flat. There is no distension.      Palpations: Abdomen is soft.      Tenderness: There is no abdominal tenderness.   Musculoskeletal:         General: No deformity. Normal range of motion.      Cervical back: Normal range of motion and neck supple.   Lymphadenopathy:      Lower Body: No right inguinal adenopathy. No left inguinal adenopathy.   Neurological:      Mental Status: She is alert and oriented to person, place, and time.   Skin:     General: Skin is warm and dry.      Findings: No erythema.   Psychiatric:         Behavior: Behavior normal.   Vitals reviewed. Exam conducted with a chaperone present.             Assessment     Diagnoses and all orders for this visit:    1. Encounter for gynecological examination without abnormal finding (Primary)    expectations reviewed.   Discussed medicare      Plan     1) Breast Health - Clinical breast exam & mammogram reviewed specifically [employer] recommendations for screening specific to her, and Self breast awareness monthly  MMG done, results pending, CBE normal   2) Pap - up to date   3) Smoking status- non-smoker   4) Colon health - screening colonoscopy up to date  5) Bone health - Weight bearing exercise, dietary calcium recommendations and vitamin D reviewed.   DEXA and FRAX last year normal, repeat 2-4 years   6) Encouraged between 7-8 hours of good sleep per night.   7) Follow up prn and one year      Robbin Mullen MD   5/19/2023  11:49 EDT

## 2023-05-26 ENCOUNTER — TELEPHONE (OUTPATIENT)
Dept: OBSTETRICS AND GYNECOLOGY | Facility: CLINIC | Age: 63
End: 2023-05-26
Payer: COMMERCIAL

## 2023-05-26 DIAGNOSIS — R92.8 ABNORMALITY OF LEFT BREAST ON SCREENING MAMMOGRAM: Primary | ICD-10-CM

## 2023-05-26 DIAGNOSIS — N64.89 BREAST ASYMMETRY: ICD-10-CM

## 2023-05-26 NOTE — TELEPHONE ENCOUNTER
Pt is aware of appt at [facility] 6/5/23 @ 2pm for DX Left Mammo & Left Limited US suggested from screening mammogram.

## 2023-06-07 ENCOUNTER — TELEPHONE (OUTPATIENT)
Dept: OBSTETRICS AND GYNECOLOGY | Facility: CLINIC | Age: 63
End: 2023-06-07
Payer: COMMERCIAL

## 2023-06-07 NOTE — TELEPHONE ENCOUNTER
Left message for Estephania that Dr Mullen received your Dx Mx & US results from [facility]. Stable findings, radiology said you are good to return to yearly screening due May 2024. Great news. Have a great day. Thank you

## 2023-06-07 NOTE — TELEPHONE ENCOUNTER
----- Message from Robbin Mullen MD sent at 6/7/2023  9:05 AM EDT -----  Kalie, Had Dx MMG and ultrasound. Stable findings, radiology good to return to yearly screening due may of next year. Please make sure she is aware. Thanks, Dr. Mullen   Detail Level: Zone Patient Specific Counseling (Will Not Stick From Patient To Patient): Keep eye on spots on back of arm. Detail Level: Detailed

## 2023-11-28 ENCOUNTER — OFFICE VISIT (OUTPATIENT)
Dept: FAMILY MEDICINE CLINIC | Facility: CLINIC | Age: 63
End: 2023-11-28
Payer: COMMERCIAL

## 2023-11-28 VITALS
OXYGEN SATURATION: 97 % | TEMPERATURE: 96 F | HEIGHT: 64 IN | SYSTOLIC BLOOD PRESSURE: 128 MMHG | BODY MASS INDEX: 28.8 KG/M2 | HEART RATE: 83 BPM | DIASTOLIC BLOOD PRESSURE: 78 MMHG | RESPIRATION RATE: 16 BRPM | WEIGHT: 168.7 LBS

## 2023-11-28 DIAGNOSIS — J30.89 ENVIRONMENTAL AND SEASONAL ALLERGIES: Primary | ICD-10-CM

## 2023-11-28 DIAGNOSIS — R79.89 TSH ELEVATION: ICD-10-CM

## 2023-11-28 DIAGNOSIS — R73.9 HYPERGLYCEMIA: ICD-10-CM

## 2023-11-28 DIAGNOSIS — E78.5 HYPERLIPIDEMIA, UNSPECIFIED HYPERLIPIDEMIA TYPE: ICD-10-CM

## 2023-11-28 DIAGNOSIS — F41.9 ANXIETY: ICD-10-CM

## 2023-11-28 RX ORDER — CITALOPRAM 40 MG/1
40 TABLET ORAL DAILY
Qty: 90 TABLET | Refills: 3 | Status: SHIPPED | OUTPATIENT
Start: 2023-11-28

## 2023-11-28 RX ORDER — ATORVASTATIN CALCIUM 10 MG/1
10 TABLET, FILM COATED ORAL DAILY
Qty: 90 TABLET | Refills: 3 | Status: SHIPPED | OUTPATIENT
Start: 2023-11-28

## 2023-11-28 NOTE — PROGRESS NOTES
Subjective   Estephania Gimenez is a 63 y.o. female. Patient is here today for follow-up on her allergies, hyperlipidemia, hyperglycemia, anxiety and borderline hypothyroidism.  She generally feels well.  Her energy level continues to be good and she is noted no new symptoms.  Chief Complaint   Patient presents with    Hyperlipidemia          Vitals:    11/28/23 0849   BP: 128/78   Pulse: 83   Resp: 16   Temp: 96 °F (35.6 °C)   SpO2: 97%     Body mass index is 28.94 kg/m².  The following portions of the patient's history were reviewed and updated as appropriate: allergies, current medications, past family history, past medical history, past social history, past surgical history and problem list.    Past Medical History:   Diagnosis Date    Anxiety     Cellulitis     Hyperlipidemia       No Known Allergies   Social History     Socioeconomic History    Marital status:    Tobacco Use    Smoking status: Former    Smokeless tobacco: Never   Substance and Sexual Activity    Alcohol use: Yes    Drug use: No    Sexual activity: Yes     Partners: Male     Birth control/protection: Surgical        Current Outpatient Medications:     atorvastatin (LIPITOR) 10 MG tablet, Take 1 tablet by mouth Daily., Disp: 90 tablet, Rfl: 3    Calcium Citrate-Vitamin D (CALCIUM CITRATE + PO), Take  by mouth., Disp: , Rfl:     citalopram (CeleXA) 40 MG tablet, Take 1 tablet by mouth Daily., Disp: 90 tablet, Rfl: 3    MULTIPLE VITAMINS-CALCIUM PO, Take  by mouth., Disp: , Rfl:      Objective     History of Present Illness     Review of Systems    Physical Exam  Vitals and nursing note reviewed.   Constitutional:       General: She is not in acute distress.     Appearance: Normal appearance. She is not ill-appearing.   HENT:      Head: Normocephalic and atraumatic.   Cardiovascular:      Rate and Rhythm: Normal rate and regular rhythm.      Heart sounds: Normal heart sounds.   Pulmonary:      Effort: Pulmonary effort is normal. No  respiratory distress.      Breath sounds: Normal breath sounds. No wheezing or rales.   Skin:     General: Skin is warm and dry.   Neurological:      General: No focal deficit present.      Mental Status: She is alert and oriented to person, place, and time.   Psychiatric:         Mood and Affect: Mood normal.         Behavior: Behavior normal.         Assessment    ASSESSMENT CMP has a sugar of 108 and was otherwise essentially normal and hemoglobin A1c continues normal at 5.6.  Lipid panel was a bit higher with total cholesterol 188, HDL 34 and  but the patient admits to missing some of her medication.  TSH was normal at 3.84 and free T4 is minimally low at 0.76 but clinically the patient's euthyroid  #1-borderline hypothyroidism, still clinically euthyroid and asymptomatic  #2-environmental and seasonal allergies, stable  #3-hyperlipidemia, reasonable control on atorvastatin  #4-hyperglycemia with continued normal hemoglobin A1c, diet controlled  #5-anxiety, controlled on citalopram    Problems Addressed this Visit          Allergies and Adverse Reactions    Environmental and seasonal allergies - Primary       Cardiac and Vasculature    Hyperlipidemia    Relevant Medications    atorvastatin (LIPITOR) 10 MG tablet       Endocrine and Metabolic    Hyperglycemia       Mental Health    Anxiety       Symptoms and Signs    TSH elevation     Diagnoses         Codes Comments    Environmental and seasonal allergies    -  Primary ICD-10-CM: J30.89  ICD-9-CM: 477.8     Hyperlipidemia, unspecified hyperlipidemia type     ICD-10-CM: E78.5  ICD-9-CM: 272.4 was on pravastatin, c/o heart burn, would like to switch to other drug, changed to lipitor 10 mg PO daily.    Hyperglycemia     ICD-10-CM: R73.9  ICD-9-CM: 790.29     Anxiety     ICD-10-CM: F41.9  ICD-9-CM: 300.00     TSH elevation     ICD-10-CM: R79.89  ICD-9-CM: 794.5             PLAN the patient received a Prevnar 20 vaccination today and I recommended the RSV  vaccination.  She will continue current medicines as now.  She needs to take the atorvastatin regularly.  She can be rechecked in 6 months with a CBC, CMP, hemoglobin A1c, lipid panel, vitamin D level and TSH and free T4    There are no Patient Instructions on file for this visit.  Return in about 6 months (around 5/28/2024) for with labs.

## 2024-03-20 ENCOUNTER — PATIENT MESSAGE (OUTPATIENT)
Dept: FAMILY MEDICINE CLINIC | Facility: CLINIC | Age: 64
End: 2024-03-20
Payer: COMMERCIAL

## 2024-05-28 ENCOUNTER — LAB (OUTPATIENT)
Dept: FAMILY MEDICINE CLINIC | Facility: CLINIC | Age: 64
End: 2024-05-28
Payer: COMMERCIAL

## 2024-05-28 DIAGNOSIS — Z00.00 ROUTINE ADULT HEALTH MAINTENANCE: ICD-10-CM

## 2024-05-28 DIAGNOSIS — R79.89 TSH ELEVATION: ICD-10-CM

## 2024-05-28 DIAGNOSIS — R73.9 HYPERGLYCEMIA: ICD-10-CM

## 2024-05-28 DIAGNOSIS — E78.5 HYPERLIPIDEMIA, UNSPECIFIED HYPERLIPIDEMIA TYPE: Primary | ICD-10-CM

## 2024-05-29 LAB
25(OH)D3+25(OH)D2 SERPL-MCNC: 26.1 NG/ML (ref 30–100)
ALBUMIN SERPL-MCNC: 4.6 G/DL (ref 3.5–5.2)
ALBUMIN/GLOB SERPL: 1.6 G/DL
ALP SERPL-CCNC: 79 U/L (ref 39–117)
ALT SERPL-CCNC: 27 U/L (ref 1–33)
APPEARANCE UR: CLEAR
AST SERPL-CCNC: 26 U/L (ref 1–32)
BACTERIA #/AREA URNS HPF: ABNORMAL /HPF
BASOPHILS # BLD AUTO: 0.03 10*3/MM3 (ref 0–0.2)
BASOPHILS NFR BLD AUTO: 0.9 % (ref 0–1.5)
BILIRUB SERPL-MCNC: 0.3 MG/DL (ref 0–1.2)
BILIRUB UR QL STRIP: NEGATIVE
BUN SERPL-MCNC: 14 MG/DL (ref 8–23)
BUN/CREAT SERPL: 20.6 (ref 7–25)
CALCIUM SERPL-MCNC: 9.4 MG/DL (ref 8.6–10.5)
CASTS URNS MICRO: ABNORMAL
CHLORIDE SERPL-SCNC: 105 MMOL/L (ref 98–107)
CHOLEST SERPL-MCNC: 180 MG/DL (ref 0–200)
CO2 SERPL-SCNC: 26.3 MMOL/L (ref 22–29)
COLOR UR: YELLOW
CREAT SERPL-MCNC: 0.68 MG/DL (ref 0.57–1)
EGFRCR SERPLBLD CKD-EPI 2021: 96.8 ML/MIN/1.73
EOSINOPHIL # BLD AUTO: 0.17 10*3/MM3 (ref 0–0.4)
EOSINOPHIL NFR BLD AUTO: 5.1 % (ref 0.3–6.2)
EPI CELLS #/AREA URNS HPF: ABNORMAL /HPF
ERYTHROCYTE [DISTWIDTH] IN BLOOD BY AUTOMATED COUNT: 12.5 % (ref 12.3–15.4)
GLOBULIN SER CALC-MCNC: 2.9 GM/DL
GLUCOSE SERPL-MCNC: 103 MG/DL (ref 65–99)
GLUCOSE UR QL STRIP: NEGATIVE
HBA1C MFR BLD: 5.8 % (ref 4.8–5.6)
HCT VFR BLD AUTO: 39.8 % (ref 34–46.6)
HDLC SERPL-MCNC: 38 MG/DL (ref 40–60)
HGB BLD-MCNC: 13.1 G/DL (ref 12–15.9)
HGB UR QL STRIP: NEGATIVE
IMM GRANULOCYTES # BLD AUTO: 0.02 10*3/MM3 (ref 0–0.05)
IMM GRANULOCYTES NFR BLD AUTO: 0.6 % (ref 0–0.5)
KETONES UR QL STRIP: NEGATIVE
LDLC SERPL CALC-MCNC: 113 MG/DL (ref 0–100)
LDLC/HDLC SERPL: 2.87 {RATIO}
LEUKOCYTE ESTERASE UR QL STRIP: ABNORMAL
LYMPHOCYTES # BLD AUTO: 1.37 10*3/MM3 (ref 0.7–3.1)
LYMPHOCYTES NFR BLD AUTO: 41.4 % (ref 19.6–45.3)
MCH RBC QN AUTO: 31.3 PG (ref 26.6–33)
MCHC RBC AUTO-ENTMCNC: 32.9 G/DL (ref 31.5–35.7)
MCV RBC AUTO: 95.2 FL (ref 79–97)
MONOCYTES # BLD AUTO: 0.5 10*3/MM3 (ref 0.1–0.9)
MONOCYTES NFR BLD AUTO: 15.1 % (ref 5–12)
NEUTROPHILS # BLD AUTO: 1.22 10*3/MM3 (ref 1.7–7)
NEUTROPHILS NFR BLD AUTO: 36.9 % (ref 42.7–76)
NITRITE UR QL STRIP: NEGATIVE
NRBC BLD AUTO-RTO: 0 /100 WBC (ref 0–0.2)
PH UR STRIP: 5.5 [PH] (ref 5–8)
PLATELET # BLD AUTO: 279 10*3/MM3 (ref 140–450)
POTASSIUM SERPL-SCNC: 4.6 MMOL/L (ref 3.5–5.2)
PROT SERPL-MCNC: 7.5 G/DL (ref 6–8.5)
PROT UR QL STRIP: NEGATIVE
RBC # BLD AUTO: 4.18 10*6/MM3 (ref 3.77–5.28)
RBC #/AREA URNS HPF: ABNORMAL /HPF
SODIUM SERPL-SCNC: 143 MMOL/L (ref 136–145)
SP GR UR STRIP: 1.02 (ref 1–1.03)
T4 FREE SERPL-MCNC: 0.72 NG/DL (ref 0.93–1.7)
TRIGL SERPL-MCNC: 164 MG/DL (ref 0–150)
TSH SERPL DL<=0.005 MIU/L-ACNC: 4.36 UIU/ML (ref 0.27–4.2)
UROBILINOGEN UR STRIP-MCNC: ABNORMAL MG/DL
VLDLC SERPL CALC-MCNC: 29 MG/DL (ref 5–40)
WBC # BLD AUTO: 3.31 10*3/MM3 (ref 3.4–10.8)
WBC #/AREA URNS HPF: ABNORMAL /HPF

## 2024-05-31 ENCOUNTER — OFFICE VISIT (OUTPATIENT)
Dept: FAMILY MEDICINE CLINIC | Facility: CLINIC | Age: 64
End: 2024-05-31
Payer: COMMERCIAL

## 2024-05-31 VITALS
HEART RATE: 87 BPM | SYSTOLIC BLOOD PRESSURE: 128 MMHG | HEIGHT: 64 IN | OXYGEN SATURATION: 99 % | TEMPERATURE: 97.5 F | BODY MASS INDEX: 28.17 KG/M2 | DIASTOLIC BLOOD PRESSURE: 82 MMHG | WEIGHT: 165 LBS | RESPIRATION RATE: 18 BRPM

## 2024-05-31 DIAGNOSIS — E03.8 SUBCLINICAL HYPOTHYROIDISM: ICD-10-CM

## 2024-05-31 DIAGNOSIS — R73.9 HYPERGLYCEMIA: ICD-10-CM

## 2024-05-31 DIAGNOSIS — F41.9 ANXIETY: ICD-10-CM

## 2024-05-31 DIAGNOSIS — E78.2 MIXED HYPERLIPIDEMIA: Primary | ICD-10-CM

## 2024-05-31 DIAGNOSIS — E55.9 VITAMIN D DEFICIENCY: ICD-10-CM

## 2024-05-31 DIAGNOSIS — D72.819 LEUKOPENIA, UNSPECIFIED TYPE: ICD-10-CM

## 2024-05-31 DIAGNOSIS — M85.80 OSTEOPENIA, UNSPECIFIED LOCATION: ICD-10-CM

## 2024-05-31 PROCEDURE — 99214 OFFICE O/P EST MOD 30 MIN: CPT | Performed by: NURSE PRACTITIONER

## 2024-05-31 RX ORDER — CITALOPRAM 40 MG/1
40 TABLET ORAL DAILY
Qty: 90 TABLET | Refills: 1 | Status: SHIPPED | OUTPATIENT
Start: 2024-05-31

## 2024-05-31 RX ORDER — ATORVASTATIN CALCIUM 20 MG/1
20 TABLET, FILM COATED ORAL DAILY
Qty: 90 TABLET | Refills: 2 | Status: SHIPPED | OUTPATIENT
Start: 2024-05-31

## 2024-05-31 RX ORDER — LEVOTHYROXINE SODIUM 0.03 MG/1
25 TABLET ORAL
Qty: 90 TABLET | Refills: 1 | Status: SHIPPED | OUTPATIENT
Start: 2024-05-31

## 2024-05-31 NOTE — PROGRESS NOTES
"Subjective     Estephania Gimenez is a 63 y.o.. female.     Patient here today to become established.     Patient with problem list that includes hyperlipidemia, anxiety, elevated TSH, hyperglycemia and allergies.     Patient stating she is eating healthy, drinking plenty of water through out day, drinking coffee in am, wine 1-2 glasses a night, does Cross fit 4 times a week and bicycles in summer. 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    Patient sees Dr Mullen, GYN, mammogram next week, last pap last year-wnl        The following portions of the patient's history were reviewed and updated as appropriate: allergies, current medications, past family history, past medical history, past social history, past surgical history and problem list.    Past Medical History:   Diagnosis Date    Anxiety     Cellulitis     Hyperlipidemia        Past Surgical History:   Procedure Laterality Date    COLONOSCOPY      TUBAL ABDOMINAL LIGATION      WISDOM TOOTH EXTRACTION         Review of Systems   Constitutional: Negative.    Respiratory: Negative.     Cardiovascular: Negative.    Psychiatric/Behavioral:  Positive for agitation. The patient is not nervous/anxious.        No Known Allergies    Objective     Vitals:    05/31/24 1101   BP: 128/82   BP Location: Right arm   Patient Position: Sitting   Cuff Size: Adult   Pulse: 87   Resp: 18   Temp: 97.5 °F (36.4 °C)   TempSrc: Infrared   SpO2: 99%   Weight: 74.8 kg (165 lb)   Height: 162.6 cm (64\")     Body mass index is 28.32 kg/m².    Physical Exam  Vitals reviewed.   HENT:      Head: Normocephalic.   Eyes:      Pupils: Pupils are equal, round, and reactive to light.   Neck:      Vascular: No carotid bruit.   Cardiovascular:      Rate and Rhythm: Normal rate and regular rhythm.      Pulses: Normal pulses.   Pulmonary:      Effort: Pulmonary effort is normal.      Breath sounds: Normal breath sounds.   Musculoskeletal:         General: Normal range of motion.      Right " lower leg: No edema.      Left lower leg: No edema.   Skin:     General: Skin is warm and dry.   Neurological:      Mental Status: She is alert and oriented to person, place, and time.   Psychiatric:         Behavior: Behavior normal.           Current Outpatient Medications:     Calcium Citrate-Vitamin D (CALCIUM CITRATE + PO), Take  by mouth., Disp: , Rfl:     citalopram (CeleXA) 40 MG tablet, Take 1 tablet by mouth Daily., Disp: 90 tablet, Rfl: 1    MULTIPLE VITAMINS-CALCIUM PO, Take  by mouth., Disp: , Rfl:     atorvastatin (LIPITOR) 20 MG tablet, Take 1 tablet by mouth Daily., Disp: 90 tablet, Rfl: 2    levothyroxine (SYNTHROID, LEVOTHROID) 25 MCG tablet, Take 1 tablet by mouth Every Morning., Disp: 90 tablet, Rfl: 1    Recent Results (from the past 2016 hour(s))   CBC & Differential    Collection Time: 05/28/24  8:06 AM    Specimen: Blood   Result Value Ref Range    WBC 3.31 (L) 3.40 - 10.80 10*3/mm3    RBC 4.18 3.77 - 5.28 10*6/mm3    Hemoglobin 13.1 12.0 - 15.9 g/dL    Hematocrit 39.8 34.0 - 46.6 %    MCV 95.2 79.0 - 97.0 fL    MCH 31.3 26.6 - 33.0 pg    MCHC 32.9 31.5 - 35.7 g/dL    RDW 12.5 12.3 - 15.4 %    Platelets 279 140 - 450 10*3/mm3    Neutrophil Rel % 36.9 (L) 42.7 - 76.0 %    Lymphocyte Rel % 41.4 19.6 - 45.3 %    Monocyte Rel % 15.1 (H) 5.0 - 12.0 %    Eosinophil Rel % 5.1 0.3 - 6.2 %    Basophil Rel % 0.9 0.0 - 1.5 %    Neutrophils Absolute 1.22 (L) 1.70 - 7.00 10*3/mm3    Lymphocytes Absolute 1.37 0.70 - 3.10 10*3/mm3    Monocytes Absolute 0.50 0.10 - 0.90 10*3/mm3    Eosinophils Absolute 0.17 0.00 - 0.40 10*3/mm3    Basophils Absolute 0.03 0.00 - 0.20 10*3/mm3    Immature Granulocyte Rel % 0.6 (H) 0.0 - 0.5 %    Immature Grans Absolute 0.02 0.00 - 0.05 10*3/mm3    nRBC 0.0 0.0 - 0.2 /100 WBC   Comprehensive Metabolic Panel    Collection Time: 05/28/24  8:06 AM    Specimen: Blood   Result Value Ref Range    Glucose 103 (H) 65 - 99 mg/dL    BUN 14 8 - 23 mg/dL    Creatinine 0.68 0.57 - 1.00  mg/dL    EGFR Result 96.8 >60.0 mL/min/1.73    BUN/Creatinine Ratio 20.6 7.0 - 25.0    Sodium 143 136 - 145 mmol/L    Potassium 4.6 3.5 - 5.2 mmol/L    Chloride 105 98 - 107 mmol/L    Total CO2 26.3 22.0 - 29.0 mmol/L    Calcium 9.4 8.6 - 10.5 mg/dL    Total Protein 7.5 6.0 - 8.5 g/dL    Albumin 4.6 3.5 - 5.2 g/dL    Globulin 2.9 gm/dL    A/G Ratio 1.6 g/dL    Total Bilirubin 0.3 0.0 - 1.2 mg/dL    Alkaline Phosphatase 79 39 - 117 U/L    AST (SGOT) 26 1 - 32 U/L    ALT (SGPT) 27 1 - 33 U/L   Lipid Panel With LDL / HDL Ratio    Collection Time: 05/28/24  8:06 AM    Specimen: Blood   Result Value Ref Range    Total Cholesterol 180 0 - 200 mg/dL    Triglycerides 164 (H) 0 - 150 mg/dL    HDL Cholesterol 38 (L) 40 - 60 mg/dL    VLDL Cholesterol Mario 29 5 - 40 mg/dL    LDL Chol Calc (NIH) 113 (H) 0 - 100 mg/dL    LDL/HDL RATIO 2.87    Hemoglobin A1c    Collection Time: 05/28/24  8:06 AM    Specimen: Blood   Result Value Ref Range    Hemoglobin A1C 5.80 (H) 4.80 - 5.60 %   TSH+Free T4    Collection Time: 05/28/24  8:06 AM    Specimen: Blood   Result Value Ref Range    TSH 4.360 (H) 0.270 - 4.200 uIU/mL    Free T4 0.72 (L) 0.93 - 1.70 ng/dL   Urinalysis With Microscopic If Indicated (No Culture) - Urine, Clean Catch    Collection Time: 05/28/24  8:06 AM    Specimen: Urine, Clean Catch   Result Value Ref Range    Specific Gravity, UA 1.018 1.005 - 1.030    pH, UA 5.5 5.0 - 8.0    Color, UA Yellow     Appearance, UA Clear Clear    Leukocytes, UA See below: (A) Negative    Protein Negative Negative    Glucose, UA Negative Negative    Ketones Negative Negative    Blood, UA Negative Negative    Bilirubin, UA Negative Negative    Urobilinogen, UA Comment     Nitrite, UA Negative Negative   Vitamin D,25-Hydroxy    Collection Time: 05/28/24  8:06 AM    Specimen: Blood   Result Value Ref Range    25 Hydroxy, Vitamin D 26.1 (L) 30.0 - 100.0 ng/mL   Microscopic Examination -    Collection Time: 05/28/24  8:06 AM   Result Value Ref  Range    WBC, UA 6-10 (A) /HPF    RBC, UA 0-2 /HPF    Epithelial Cells (non renal) 3-6 (A) /HPF    Cast Type Comment     Bacteria, UA Comment None Seen /HPF       DEXA Bone Density Axial  Narrative: BONE MINERAL DENSITOMETRY     HISTORY: Postmenopausal.     COMPARISON: BMD 08/07/2020, 05/14/2018.     TECHNIQUE: The bone mineral density was determined using a dual-energy  x-ray source. Fracture risk is related to the absolute bone density and  is expressed as compared to a young gender-matched population  representative of the main peak bone density. Fracture risk is not  calculated for patients with osteoporosis or patients receiving  treatment for osteoporosis.      For postmenopausal women, men over age 65, and for men age 50-65 with  other risk factors, the WHO defines osteopenia is greater than -2.5 and  less than -1.0 standard deviations below peak BMD [T score] and  osteoporosis as 2.5 standard deviations or more below peak BMD. The risk  of osteoporotic fracture doubles for each standard deviation below peak  BMD. For premenopausal women, men under age 50, and for children, Z  scores are used to compare bone density to age-matched controls. The  diagnosis of osteoporosis in these populations requires clinical  assessment of additional risk factors.     Note that the lumbar bone density may be artificially elevated due to  vertebral compression fracture, degenerative disc disease/osteophyte  formation and sclerosis, aortic calcification. Femoral BMD could be  falsely elevated in the setting of degenerative change/arthritis.     FINDINGS:   LUMBAR SPINE:  The BMD measured in the L1-L4 is 0.876 g/cm2 for a  T-score of -1.6 and a Z-score of 0.2.     LEFT HIP: The BMD for the femoral neck is 0.757g/cm2 for a T score of   -0.8 and a Z score of 0.6     RIGHT HIP:  The BMD for the femoral neck is 0.786g/cm2 for a T score of  -0.6 and a Z score of 0.9     Impression: 1. Osteopenia.  2. When compared to the prior exam  08/07/2020, the bone mineral density  of the lumbar spine has decreased 7% which is a statistically  significant change, though there has been no statistically significant  change in bone mineral density of either total hip.  3. The 10-year FRAX (World Health Organization) fracture risk for a  major osteoporotic fracture is 8.5% and for a hip fracture is 1%.        Current recommendations are that a follow-up bone density be obtained at  an interval of not less than 2 years except in specific circumstances,  such as after initiation or change of therapy.     This report was finalized on 9/19/2022 4:35 PM by Dr. Cliff Cerrato M.D.           Diagnoses and all orders for this visit:    1. Mixed hyperlipidemia (Primary)  -     atorvastatin (LIPITOR) 20 MG tablet; Take 1 tablet by mouth Daily.  Dispense: 90 tablet; Refill: 2  -     Lipid Panel With LDL / HDL Ratio; Future  -     Urinalysis With Microscopic - Urine, Clean Catch; Future    2. Subclinical hypothyroidism  -     levothyroxine (SYNTHROID, LEVOTHROID) 25 MCG tablet; Take 1 tablet by mouth Every Morning.  Dispense: 90 tablet; Refill: 1  -     TSH; Future  -     T4, Free; Future  -     T3, Free; Future    3. Anxiety  -     citalopram (CeleXA) 40 MG tablet; Take 1 tablet by mouth Daily.  Dispense: 90 tablet; Refill: 1  -     Comprehensive Metabolic Panel; Future  -     TSH; Future  -     T4, Free; Future  -     T3, Free; Future  -     Urinalysis With Microscopic - Urine, Clean Catch; Future    4. Osteopenia, unspecified location    5. Leukopenia, unspecified type  -     CBC & Differential; Future  -     Urinalysis With Microscopic - Urine, Clean Catch; Future    6. Hyperglycemia  -     Hemoglobin A1c; Future  -     Urinalysis With Microscopic - Urine, Clean Catch; Future    7. Vitamin D deficiency  -     Vitamin D,25-Hydroxy; Future          Patient Instructions   Hyperlipidemia: , LDL goal ~70; starting atorvastatin 20 mg 1 tab daily; recommend Patient  to eat a heart healthy low fat low low sugar diet, drink plenty of water with goal 64 oz a day and exercise 3-5 times a week, for more than 30 minutes at a time; recheck labs in 6 months    Hypothyroidism: TSH slightly elevated, T4 free low; starting on levothyroxine 25 mcg 1 tab daily. Recheck labs in 6 months    Anxiety: stable, continue citalopram 40 mg 1 tab daily     Osteopenia/vitamin D deficiency: Recommend otc Vitamin D/Calcium supplement daily, adequate daily protein intake, daily weight bearing activities/exercises, strengthening exercises/activities, limit alcohol intake and annual height checks. Recheck Dexa scan in this year or next.     Leukopenia: monitor for now    Hyperglycemia: recommend lifestyle modifications, repeat labs in 6 months    Return for recheck in 3 months, Annual physical.

## 2024-06-07 ENCOUNTER — APPOINTMENT (OUTPATIENT)
Dept: WOMENS IMAGING | Facility: HOSPITAL | Age: 64
End: 2024-06-07
Payer: COMMERCIAL

## 2024-06-07 PROBLEM — D72.819 LEUKOPENIA: Status: ACTIVE | Noted: 2024-06-07

## 2024-06-07 PROBLEM — M85.80 OSTEOPENIA: Status: ACTIVE | Noted: 2024-06-07

## 2024-06-07 PROBLEM — E03.8 SUBCLINICAL HYPOTHYROIDISM: Status: ACTIVE | Noted: 2024-06-07

## 2024-06-07 PROBLEM — E55.9 VITAMIN D DEFICIENCY: Status: ACTIVE | Noted: 2024-06-07

## 2024-06-07 PROCEDURE — 77067 SCR MAMMO BI INCL CAD: CPT | Performed by: RADIOLOGY

## 2024-06-07 PROCEDURE — 77063 BREAST TOMOSYNTHESIS BI: CPT | Performed by: RADIOLOGY

## 2024-06-08 NOTE — PATIENT INSTRUCTIONS
Hyperlipidemia: , LDL goal ~70; starting atorvastatin 20 mg 1 tab daily; recommend Patient to eat a heart healthy low fat low low sugar diet, drink plenty of water with goal 64 oz a day and exercise 3-5 times a week, for more than 30 minutes at a time; recheck labs in 6 months    Hypothyroidism: TSH slightly elevated, T4 free low; starting on levothyroxine 25 mcg 1 tab daily. Recheck labs in 6 months    Anxiety: stable, continue citalopram 40 mg 1 tab daily     Osteopenia/vitamin D deficiency: Recommend otc Vitamin D/Calcium supplement daily, adequate daily protein intake, daily weight bearing activities/exercises, strengthening exercises/activities, limit alcohol intake and annual height checks. Recheck Dexa scan in this year or next.     Leukopenia: monitor for now    Hyperglycemia: recommend lifestyle modifications, repeat labs in 6 months

## 2024-09-18 ENCOUNTER — TELEPHONE (OUTPATIENT)
Dept: FAMILY MEDICINE CLINIC | Facility: CLINIC | Age: 64
End: 2024-09-18

## 2024-09-18 NOTE — TELEPHONE ENCOUNTER
Caller: Estephania Gimenez    Relationship: Self    Best call back number: [phone]*    What is the best time to reach you: ANYTIME    Who are you requesting to speak with (clinical staff, provider,  specific staff member): CLINICAL    What was the call regarding: PATIENT IS WANTING TO KNOW THE DATE OF HER LAST COLONOSCOPY, AND WHO PERFORMED IT.    Is it okay if the provider responds through [facility]: NO, REQUEST TELEPHONE CALL BACK.

## 2025-02-06 NOTE — PROGRESS NOTES
"Subjective   Estephania Gimenez is a 64 y.o. female. Patient is here today for   Chief Complaint   Patient presents with    Annual Exam    Abnormal Lab     Lipid panel 01/27          Vitals:    02/07/25 1316   BP: 120/74   BP Location: Right arm   Patient Position: Sitting   Cuff Size: Adult   Pulse: 61   Resp: 17   Temp: 98 °F (36.7 °C)   TempSrc: Temporal   SpO2: 98%   Weight: 74.5 kg (164 lb 3.2 oz)   Height: 162.6 cm (64.02\")      Body mass index is 28.17 kg/m².    The following portions of the patient's history were reviewed and updated as appropriate: allergies, current medications, past family history, past medical history, past social history, past surgical history and problem list.    Past Medical History:   Diagnosis Date    Anxiety     Cellulitis     Hyperlipidemia       No Known Allergies   Social History     Socioeconomic History    Marital status:    Tobacco Use    Smoking status: Never     Passive exposure: Never    Smokeless tobacco: Never   Vaping Use    Vaping status: Never Used   Substance and Sexual Activity    Alcohol use: Yes     Alcohol/week: 6.0 standard drinks of alcohol     Types: 6 Glasses of wine per week    Drug use: No    Sexual activity: Yes     Partners: Male     Birth control/protection: Surgical        Current Outpatient Medications:     atorvastatin (LIPITOR) 20 MG tablet, Take 1 tablet by mouth Daily., Disp: 90 tablet, Rfl: 3    Calcium Citrate-Vitamin D (CALCIUM CITRATE + PO), Take  by mouth., Disp: , Rfl:     citalopram (CeleXA) 40 MG tablet, Take 1 tablet by mouth Daily., Disp: 90 tablet, Rfl: 3    levothyroxine (SYNTHROID, LEVOTHROID) 25 MCG tablet, Take 1 tablet by mouth Every Morning., Disp: 90 tablet, Rfl: 3    MULTIPLE VITAMINS-CALCIUM PO, Take  by mouth., Disp: , Rfl:      Last Mammogram: 6/20/24 negative  Dr. Mullen, GYN  Last pap: 2/24 with wnl results per Patient    Last Colonoscopy: 4/12/21 by Dr. Shaffer with normal results, repeat in 5 years    ECG Report: 5/7/19 " SR, vent rate 59  Vascular screening 5/20/19·right carotid artery is normal. The left carotid artery is normal. Aorta was normal. The right leg has normal arterial pressures. The left leg has normal arterial pressures.      Objective   History of Present Illness  Patient here today for annual physical.      Estephania Gimenez 64 y.o. female who presents for an Annual Wellness Visit.  she has a history of   Patient Active Problem List   Diagnosis    Anxiety    Hyperlipidemia    Encounter for gynecological examination without abnormal finding    Environmental and seasonal allergies    Hyperglycemia    Exertional headache    Neck pain, acute    TSH elevation    Post-menopausal    Subclinical hypothyroidism    Osteopenia    Leukopenia    Vitamin D deficiency   .  she has been doing well with new interval problems.  Labs results discussed in detail with the patient.  Plan to update vaccines if needed today.    Health Habits:  Dental Exam. up to date  Eye Exam. up to date  Exercise: 5 times/week.  Current exercise activities include: walking 3 miles a day  Diet: somewhat healthy  Water: 64 oz a day  3 cups of coffee a day  1-2 glasses of wine per night    Preventative counseling  Discussed face to face the importance of healthy diet and exercise.     PHQ-9 Depression Screening  Little interest or pleasure in doing things? Not at all   Feeling down, depressed, or hopeless? Not at all   PHQ-2 Total Score 0   Trouble falling or staying asleep, or sleeping too much?     Feeling tired or having little energy?     Poor appetite or overeating?     Feeling bad about yourself - or that you are a failure or have let yourself or your family down?     Trouble concentrating on things, such as reading the newspaper or watching television?     Moving or speaking so slowly that other people could have noticed? Or the opposite - being so fidgety or restless that you have been moving around a lot more than usual?     Thoughts that you would  be better off dead, or of hurting yourself in some way?     PHQ-9 Total Score     If you checked off any problems, how difficult have these problems made it for you to do your work, take care of things at home, or get along with other people? Not difficult at all       No results found for this or any previous visit (from the past 2 weeks).       Review of Systems   Constitutional:  Positive for fever (last fever was on Wednesday).   HENT:  Positive for congestion. Negative for ear pain, postnasal drip, rhinorrhea and sore throat.    Respiratory:  Positive for cough (2-3 days). Negative for shortness of breath.         Started feeling bad on Sunday, started feeling better today     Cardiovascular: Negative.    Gastrointestinal:  Negative for diarrhea, nausea and vomiting.   Neurological:  Negative for headaches.   Psychiatric/Behavioral:  Negative for dysphoric mood, self-injury, sleep disturbance and suicidal ideas. The patient is not nervous/anxious.        Physical Exam  Constitutional:       Appearance: Normal appearance. She is well-developed.   HENT:      Head: Normocephalic and atraumatic.      Right Ear: Tympanic membrane normal. Tympanic membrane is not erythematous.      Left Ear: Tympanic membrane normal. Tympanic membrane is not erythematous.      Nose: Nose normal.   Eyes:      Conjunctiva/sclera: Conjunctivae normal.      Pupils: Pupils are equal, round, and reactive to light.   Neck:      Thyroid: No thyromegaly.      Vascular: No carotid bruit.      Trachea: No tracheal deviation.   Cardiovascular:      Rate and Rhythm: Normal rate and regular rhythm.      Pulses: Normal pulses.      Heart sounds: Normal heart sounds. No murmur heard.  Pulmonary:      Effort: No accessory muscle usage or respiratory distress.      Breath sounds: Normal breath sounds. No stridor. No decreased breath sounds, wheezing, rhonchi or rales.   Abdominal:      General: Bowel sounds are normal. There is no distension.       Palpations: Abdomen is soft. Abdomen is not rigid. There is no mass.      Tenderness: There is no abdominal tenderness. There is no guarding or rebound.      Hernia: No hernia is present.   Musculoskeletal:         General: Normal range of motion.      Cervical back: Normal range of motion and neck supple.   Lymphadenopathy:      Cervical: No cervical adenopathy.   Skin:     General: Skin is warm and dry.      Capillary Refill: Capillary refill takes less than 2 seconds.   Neurological:      Mental Status: She is alert and oriented to person, place, and time.      Cranial Nerves: No cranial nerve deficit.      Sensory: No sensory deficit.      Motor: No abnormal muscle tone.      Coordination: Coordination normal.   Psychiatric:         Speech: Speech normal.         Behavior: Behavior normal.         ASSESSMENT       Problems Addressed this Visit          Cardiac and Vasculature    Hyperlipidemia    Relevant Medications    atorvastatin (LIPITOR) 20 MG tablet    Other Relevant Orders    Lipid Panel With LDL / HDL Ratio       Endocrine and Metabolic    Subclinical hypothyroidism    Relevant Medications    levothyroxine (SYNTHROID, LEVOTHROID) 25 MCG tablet    Other Relevant Orders    TSH    T4, Free       Mental Health    Anxiety    Relevant Medications    citalopram (CeleXA) 40 MG tablet    Other Relevant Orders    Comprehensive Metabolic Panel     Other Visit Diagnoses       Annual physical exam    -  Primary    Acute URI        Encounter for osteoporosis screening in asymptomatic postmenopausal patient        Relevant Orders    DEXA Bone Density Axial          Diagnoses         Codes Comments    Annual physical exam    -  Primary ICD-10-CM: Z00.00  ICD-9-CM: V70.0     Mixed hyperlipidemia     ICD-10-CM: E78.2  ICD-9-CM: 272.2     Subclinical hypothyroidism     ICD-10-CM: E03.8  ICD-9-CM: 244.8     Anxiety     ICD-10-CM: F41.9  ICD-9-CM: 300.00     Acute URI     ICD-10-CM: J06.9  ICD-9-CM: 465.9     Encounter for  osteoporosis screening in asymptomatic postmenopausal patient     ICD-10-CM: Z13.820, Z78.0  ICD-9-CM: V82.81, V49.81           PLAN    Patient Instructions   Hyperlipidemia: triglycerides elevated, recommend reducing wine intake; LDL 77, stable, continue atorvastatin 20 mg daily; recommend working on eating a heart  healthy low fat low sugar diet, drink plenty of water, continue to exercise as current with starting a weight lifting/resistance band training 3 times a week. Recommend rechecking labs in 6 months.    Hypothyroidism: stable, continue levothyroxine 25 mcg 1 tab daily    Anxiety: stable, continue citalopram 40 mg daily    URI: Drink plenty of fluids-water preferably, eat a heart healthy diet, and get plenty of sleep.        Return for fasting labs in 6 months, recheck in 6 months.

## 2025-02-07 ENCOUNTER — OFFICE VISIT (OUTPATIENT)
Dept: FAMILY MEDICINE CLINIC | Facility: CLINIC | Age: 65
End: 2025-02-07
Payer: COMMERCIAL

## 2025-02-07 VITALS
RESPIRATION RATE: 17 BRPM | SYSTOLIC BLOOD PRESSURE: 120 MMHG | HEIGHT: 64 IN | WEIGHT: 164.2 LBS | DIASTOLIC BLOOD PRESSURE: 74 MMHG | TEMPERATURE: 98 F | HEART RATE: 61 BPM | OXYGEN SATURATION: 98 % | BODY MASS INDEX: 28.03 KG/M2

## 2025-02-07 DIAGNOSIS — E78.2 MIXED HYPERLIPIDEMIA: ICD-10-CM

## 2025-02-07 DIAGNOSIS — Z78.0 ENCOUNTER FOR OSTEOPOROSIS SCREENING IN ASYMPTOMATIC POSTMENOPAUSAL PATIENT: ICD-10-CM

## 2025-02-07 DIAGNOSIS — F41.9 ANXIETY: ICD-10-CM

## 2025-02-07 DIAGNOSIS — Z13.820 ENCOUNTER FOR OSTEOPOROSIS SCREENING IN ASYMPTOMATIC POSTMENOPAUSAL PATIENT: ICD-10-CM

## 2025-02-07 DIAGNOSIS — E03.8 SUBCLINICAL HYPOTHYROIDISM: ICD-10-CM

## 2025-02-07 DIAGNOSIS — J06.9 ACUTE URI: ICD-10-CM

## 2025-02-07 DIAGNOSIS — Z00.00 ANNUAL PHYSICAL EXAM: Primary | ICD-10-CM

## 2025-02-07 PROCEDURE — 99397 PER PM REEVAL EST PAT 65+ YR: CPT | Performed by: NURSE PRACTITIONER

## 2025-02-07 PROCEDURE — 99213 OFFICE O/P EST LOW 20 MIN: CPT | Performed by: NURSE PRACTITIONER

## 2025-02-07 RX ORDER — ATORVASTATIN CALCIUM 20 MG/1
20 TABLET, FILM COATED ORAL DAILY
Qty: 90 TABLET | Refills: 3 | Status: SHIPPED | OUTPATIENT
Start: 2025-02-07

## 2025-02-07 RX ORDER — CITALOPRAM HYDROBROMIDE 40 MG/1
40 TABLET ORAL DAILY
Qty: 90 TABLET | Refills: 3 | Status: SHIPPED | OUTPATIENT
Start: 2025-02-07

## 2025-02-07 RX ORDER — LEVOTHYROXINE SODIUM 25 UG/1
25 TABLET ORAL
Qty: 90 TABLET | Refills: 3 | Status: SHIPPED | OUTPATIENT
Start: 2025-02-07

## 2025-02-11 NOTE — PATIENT INSTRUCTIONS
Hyperlipidemia: triglycerides elevated, recommend reducing wine intake; LDL 77, stable, continue atorvastatin 20 mg daily; recommend working on eating a heart  healthy low fat low sugar diet, drink plenty of water, continue to exercise as current with starting a weight lifting/resistance band training 3 times a week. Recommend rechecking labs in 6 months.    Hypothyroidism: stable, continue levothyroxine 25 mcg 1 tab daily    Anxiety: stable, continue citalopram 40 mg daily    URI: Drink plenty of fluids-water preferably, eat a heart healthy diet, and get plenty of sleep.

## 2025-02-28 ENCOUNTER — HOSPITAL ENCOUNTER (OUTPATIENT)
Dept: BONE DENSITY | Facility: HOSPITAL | Age: 65
Discharge: HOME | End: 2025-02-28
Admitting: NURSE PRACTITIONER
Payer: COMMERCIAL

## 2025-02-28 PROCEDURE — 77080 DXA BONE DENSITY AXIAL: CPT

## 2025-03-10 ENCOUNTER — OFFICE VISIT (OUTPATIENT)
Dept: OBSTETRICS AND GYNECOLOGY | Facility: CLINIC | Age: 65
End: 2025-03-10
Payer: COMMERCIAL

## 2025-03-10 VITALS
DIASTOLIC BLOOD PRESSURE: 83 MMHG | BODY MASS INDEX: 28.68 KG/M2 | HEIGHT: 64 IN | SYSTOLIC BLOOD PRESSURE: 133 MMHG | HEART RATE: 80 BPM | WEIGHT: 168 LBS

## 2025-03-10 DIAGNOSIS — Z01.419 ENCOUNTER FOR GYNECOLOGICAL EXAMINATION WITHOUT ABNORMAL FINDING: Primary | ICD-10-CM

## 2025-03-10 NOTE — PROGRESS NOTES
GYN Annual Exam     CC- Here for annual exam.     Estephania Gimenez is a 64 y.o. female who presents for annual well woman exam. Periods are  absent due to Menopause.  ,    OB History          1    Para   1    Term   1            AB        Living   1         SAB        IAB        Ectopic        Molar        Multiple        Live Births   1                Current contraception: post menopausal status  History of abnormal Pap smear: no  Family history of uterine, colon or ovarian cancer: yes - colon - uncle   History of abnormal mammogram: no  Family history of breast cancer: yes - aunt on each side   Most recent pap: 2021 NIL HPV neg, next due today  Most recent breast imaging : 2024   Most recent colon screen : colonoscopy 2021, next due per note   Bone density - DEXA : 2025, Osteopenia T score spine -1.5, FRAX 7.1% and 0.3% consider repeat 2-4 years, any recent vitamin D level 2025-38.3  (Personal history of bone fracture as adult No, hip fracture in either parent from osteoporosis (not replacement, fracture) No) - Mom with Osteoporosis.    Past Medical History:   Diagnosis Date    Anxiety     Cellulitis     Disease of thyroid gland     Hyperlipidemia        Past Surgical History:   Procedure Laterality Date    COLONOSCOPY      TUBAL ABDOMINAL LIGATION      WISDOM TOOTH EXTRACTION           Current Outpatient Medications:     atorvastatin (LIPITOR) 20 MG tablet, Take 1 tablet by mouth Daily., Disp: 90 tablet, Rfl: 3    Calcium Citrate-Vitamin D (CALCIUM CITRATE + PO), Take  by mouth., Disp: , Rfl:     citalopram (CeleXA) 40 MG tablet, Take 1 tablet by mouth Daily., Disp: 90 tablet, Rfl: 3    levothyroxine (SYNTHROID, LEVOTHROID) 25 MCG tablet, Take 1 tablet by mouth Every Morning., Disp: 90 tablet, Rfl: 3    MULTIPLE VITAMINS-CALCIUM PO, Take  by mouth., Disp: , Rfl:     No Known Allergies    Social History     Tobacco Use    Smoking status: Never     Passive exposure:  "Never    Smokeless tobacco: Never   Vaping Use    Vaping status: Never Used   Substance Use Topics    Alcohol use: Yes     Alcohol/week: 6.0 standard drinks of alcohol     Types: 6 Glasses of wine per week    Drug use: No       Family History   Problem Relation Age of Onset    COPD Mother     No Known Problems Father     Cancer Maternal Aunt     Breast cancer Maternal Aunt         over 60    Breast cancer Paternal Aunt         over 60    Throat cancer Paternal Uncle     Heart disease Maternal Grandmother     Colon cancer Paternal Uncle     Heart disease Paternal Uncle     Heart disease Paternal Uncle     Ovarian cancer Neg Hx     Uterine cancer Neg Hx     Deep vein thrombosis Neg Hx     Pulmonary embolism Neg Hx        Review of Systems   Constitutional:  Negative for chills, fever and unexpected weight loss.   Gastrointestinal:  Negative for abdominal pain.   Genitourinary:  Negative for dysuria, pelvic pain, vaginal bleeding and vaginal discharge.   All other systems reviewed and are negative.      /83   Pulse 80   Ht 162.6 cm (64\")   Wt 76.2 kg (168 lb)   BMI 28.84 kg/m²     Physical Exam  Constitutional:       General: She is not in acute distress.     Appearance: She is well-developed and normal weight.   Genitourinary:      Vulva, bladder, rectum and urethral meatus normal.      Right Labia: No lesions or Bartholin's cyst.     Left Labia: No lesions or Bartholin's cyst.     No inguinal adenopathy present in the right or left side.     No vaginal discharge or bleeding.      No vaginal prolapse present.     Mild vaginal atrophy present.       Right Adnexa: not tender, not full and no mass present.     Left Adnexa: not tender, not full and no mass present.     No cervical motion tenderness or friability.      No parametrium thickening present.     Uterus is not enlarged or tender.      No uterine mass detected.  Rectum:      No rectal mass or abnormal anal tone.   Breasts:     Right: No inverted nipple, " mass or nipple discharge.      Left: No inverted nipple, mass or nipple discharge.   HENT:      Head: Normocephalic and atraumatic.   Eyes:      Conjunctiva/sclera: Conjunctivae normal.      Pupils: Pupils are equal, round, and reactive to light.   Neck:      Thyroid: No thyromegaly.   Cardiovascular:      Rate and Rhythm: Normal rate and regular rhythm.      Heart sounds: Normal heart sounds. No murmur heard.  Pulmonary:      Effort: Pulmonary effort is normal. No respiratory distress.      Breath sounds: Normal breath sounds.   Abdominal:      General: Abdomen is flat. There is no distension.      Palpations: Abdomen is soft.      Tenderness: There is no abdominal tenderness.   Musculoskeletal:         General: No deformity. Normal range of motion.      Cervical back: Normal range of motion and neck supple.   Lymphadenopathy:      Lower Body: No right inguinal adenopathy. No left inguinal adenopathy.   Neurological:      Mental Status: She is alert and oriented to person, place, and time.   Skin:     General: Skin is warm and dry.      Findings: No erythema.   Psychiatric:         Behavior: Behavior normal.   Vitals reviewed. Exam conducted with a chaperone present.             Assessment     Diagnoses and all orders for this visit:    1. Encounter for gynecological examination without abnormal finding (Primary)  -     IGP, Apt HPV,rfx 16 / 18,45         Plan     1) Breast Health - Clinical breast exam & mammogram reviewed specifically [employer] recommendations for screening specific to her, and Self breast awareness monthly  CBE normal, MMG up to date   2) Pap - updated today, expectations reviewed.   3) Smoking status- non-smoker   4) Colon health - screening colonoscopy up to date, due 2026   5) Bone health - Weight bearing exercise, dietary calcium recommendations and vitamin D reviewed.   Up to date, follow up reviewed.   6) Eat a balanced diet including regular green vegetables .   7) Follow up  prn and one year      Robbin Mullen MD   3/10/2025  13:49 EDT

## 2025-03-12 LAB
CYTOLOGIST CVX/VAG CYTO: NORMAL
CYTOLOGY CVX/VAG DOC CYTO: NORMAL
CYTOLOGY CVX/VAG DOC THIN PREP: NORMAL
DX ICD CODE: NORMAL
HPV I/H RISK 4 DNA CVX QL PROBE+SIG AMP: NEGATIVE
OTHER STN SPEC: NORMAL
SERVICE CMNT-IMP: NORMAL
STAT OF ADQ CVX/VAG CYTO-IMP: NORMAL

## 2025-04-25 ENCOUNTER — HOSPITAL ENCOUNTER (OUTPATIENT)
Dept: GENERAL RADIOLOGY | Facility: HOSPITAL | Age: 65
Discharge: HOME | End: 2025-04-25
Admitting: NURSE PRACTITIONER
Payer: COMMERCIAL

## 2025-04-25 ENCOUNTER — OFFICE VISIT (OUTPATIENT)
Dept: FAMILY MEDICINE CLINIC | Facility: CLINIC | Age: 65
End: 2025-04-25
Payer: COMMERCIAL

## 2025-04-25 VITALS
HEART RATE: 98 BPM | TEMPERATURE: 98.5 F | OXYGEN SATURATION: 98 % | RESPIRATION RATE: 16 BRPM | DIASTOLIC BLOOD PRESSURE: 84 MMHG | HEIGHT: 64 IN | SYSTOLIC BLOOD PRESSURE: 122 MMHG | BODY MASS INDEX: 28.56 KG/M2 | WEIGHT: 167.3 LBS

## 2025-04-25 DIAGNOSIS — M25.561 RIGHT KNEE PAIN, UNSPECIFIED CHRONICITY: Primary | ICD-10-CM

## 2025-04-25 DIAGNOSIS — M25.561 RIGHT KNEE PAIN, UNSPECIFIED CHRONICITY: ICD-10-CM

## 2025-04-25 PROCEDURE — 73560 X-RAY EXAM OF KNEE 1 OR 2: CPT

## 2025-04-25 PROCEDURE — 99213 OFFICE O/P EST LOW 20 MIN: CPT | Performed by: NURSE PRACTITIONER

## 2025-04-25 NOTE — PROGRESS NOTES
Subjective     Estephania Gimenez is a 64 y.o.. female.       knee pain  Symptoms are: recurrent (2 months).   Symptoms occur: intermittently.  Pertinent negative symptoms include no abdominal pain, no anorexia, no joint pain, no change in stool, no chest pain, no chills, no congestion, no cough, no diaphoresis, no fatigue, no fever, no headaches, no joint swelling, no myalgias, no nausea, no neck pain, no numbness, no rash, no sore throat, no swollen glands, no dysuria, no vertigo, no visual change, no vomiting and no weakness.   Treatment and/or Medications comments include: advil and ice   Knee Pain   There was no injury mechanism. The pain is present in the right knee. Pertinent negatives include no inability to bear weight, loss of motion, loss of sensation, muscle weakness, numbness or tingling. The symptoms are aggravated by movement and weight bearing.       The following portions of the patient's history were reviewed and updated as appropriate: allergies, current medications, past family history, past medical history, past social history, past surgical history and problem list.    Past Medical History:   Diagnosis Date    Anxiety     Cellulitis     Disease of thyroid gland     Hyperlipidemia        Past Surgical History:   Procedure Laterality Date    COLONOSCOPY      TUBAL ABDOMINAL LIGATION      WISDOM TOOTH EXTRACTION         Review of Systems   Constitutional:  Negative for chills, diaphoresis, fatigue and fever.   HENT:  Negative for congestion and sore throat.    Respiratory:  Negative for cough.    Cardiovascular:  Negative for chest pain.   Gastrointestinal:  Negative for abdominal pain, anorexia, nausea and vomiting.   Genitourinary:  Negative for dysuria.   Musculoskeletal:  Negative for joint pain, myalgias and neck pain.   Skin:  Negative for rash.   Neurological:  Negative for vertigo, tingling, weakness, numbness and headaches.       No Known Allergies    Objective     Vitals:    04/25/25 1300  "  BP: 122/84   BP Location: Right arm   Patient Position: Sitting   Cuff Size: Adult   Pulse: 98   Resp: 16   Temp: 98.5 °F (36.9 °C)   TempSrc: Oral   SpO2: 98%   Weight: 75.9 kg (167 lb 4.8 oz)   Height: 162.6 cm (64.02\")     Body mass index is 28.7 kg/m².    Physical Exam  Vitals reviewed.   HENT:      Head: Normocephalic.   Eyes:      Pupils: Pupils are equal, round, and reactive to light.   Cardiovascular:      Rate and Rhythm: Normal rate and regular rhythm.      Pulses: Normal pulses.   Pulmonary:      Effort: Pulmonary effort is normal.      Breath sounds: Normal breath sounds.   Musculoskeletal:         General: Normal range of motion.      Right knee: Swelling present. No deformity, effusion, erythema, ecchymosis or crepitus. Normal range of motion. Tenderness present over the lateral joint line. Normal alignment.      Instability Tests: Posterior drawer test negative. Anterior Lachman test negative. Medial Khadar test negative and lateral Khadar test negative.   Skin:     General: Skin is warm and dry.   Neurological:      Mental Status: She is alert and oriented to person, place, and time.   Psychiatric:         Behavior: Behavior normal.           Current Outpatient Medications:     atorvastatin (LIPITOR) 20 MG tablet, Take 1 tablet by mouth Daily., Disp: 90 tablet, Rfl: 3    Calcium Citrate-Vitamin D (CALCIUM CITRATE + PO), Take  by mouth., Disp: , Rfl:     citalopram (CeleXA) 40 MG tablet, Take 1 tablet by mouth Daily., Disp: 90 tablet, Rfl: 3    levothyroxine (SYNTHROID, LEVOTHROID) 25 MCG tablet, Take 1 tablet by mouth Every Morning., Disp: 90 tablet, Rfl: 3    MULTIPLE VITAMINS-CALCIUM PO, Take  by mouth., Disp: , Rfl:         Diagnoses and all orders for this visit:    1. Right knee pain, unspecified chronicity (Primary)  -     XR Knee 1 or 2 View Right; Future        Patient Instructions   Ordering xray for further eval.  Recommend using soft knee brace when standing/walking for long periods " of time  Try to rest right knee as much as able  Ice right knee 2-3 times daily as needed      Return if symptoms worsen or fail to improve.

## 2025-04-25 NOTE — PATIENT INSTRUCTIONS
Ordering xray for further eval.  Recommend using soft knee brace when standing/walking for long periods of time  Try to rest right knee as much as able  Ice right knee 2-3 times daily as needed

## 2025-04-30 ENCOUNTER — RESULTS FOLLOW-UP (OUTPATIENT)
Dept: FAMILY MEDICINE CLINIC | Facility: CLINIC | Age: 65
End: 2025-04-30
Payer: COMMERCIAL

## 2025-04-30 NOTE — TELEPHONE ENCOUNTER
Called and left message regarding needing to discuss xray results; also sent Patient a detailed my chart message. Awaiting response.

## 2025-06-11 ENCOUNTER — APPOINTMENT (OUTPATIENT)
Dept: MRI IMAGING | Facility: HOSPITAL | Age: 65
End: 2025-06-11
Payer: COMMERCIAL

## 2025-06-11 ENCOUNTER — HOSPITAL ENCOUNTER (OUTPATIENT)
Facility: HOSPITAL | Age: 65
Setting detail: OBSERVATION
Discharge: HOME | End: 2025-06-12
Attending: EMERGENCY MEDICINE | Admitting: EMERGENCY MEDICINE
Payer: COMMERCIAL

## 2025-06-11 ENCOUNTER — APPOINTMENT (OUTPATIENT)
Dept: CARDIOLOGY | Facility: HOSPITAL | Age: 65
End: 2025-06-11
Payer: COMMERCIAL

## 2025-06-11 DIAGNOSIS — M79.89 RIGHT LEG SWELLING: Primary | ICD-10-CM

## 2025-06-11 PROBLEM — S80.10XA HEMATOMA OF LOWER EXTREMITY: Status: ACTIVE | Noted: 2025-06-11

## 2025-06-11 LAB
ANION GAP SERPL CALCULATED.3IONS-SCNC: 9.6 MMOL/L (ref 5–15)
BASOPHILS # BLD AUTO: 0.03 10*3/MM3 (ref 0–0.2)
BASOPHILS NFR BLD AUTO: 0.6 % (ref 0–1.5)
BH CV LOWER VASCULAR LEFT COMMON FEMORAL AUGMENT: NORMAL
BH CV LOWER VASCULAR LEFT COMMON FEMORAL COMPETENT: NORMAL
BH CV LOWER VASCULAR LEFT COMMON FEMORAL COMPRESS: NORMAL
BH CV LOWER VASCULAR LEFT COMMON FEMORAL PHASIC: NORMAL
BH CV LOWER VASCULAR LEFT COMMON FEMORAL SPONT: NORMAL
BH CV LOWER VASCULAR RIGHT COMMON FEMORAL AUGMENT: NORMAL
BH CV LOWER VASCULAR RIGHT COMMON FEMORAL COMPETENT: NORMAL
BH CV LOWER VASCULAR RIGHT COMMON FEMORAL COMPRESS: NORMAL
BH CV LOWER VASCULAR RIGHT COMMON FEMORAL PHASIC: NORMAL
BH CV LOWER VASCULAR RIGHT COMMON FEMORAL SPONT: NORMAL
BH CV LOWER VASCULAR RIGHT DISTAL FEMORAL COMPRESS: NORMAL
BH CV LOWER VASCULAR RIGHT GASTRONEMIUS COMPRESS: NORMAL
BH CV LOWER VASCULAR RIGHT GREATER SAPH AK COMPRESS: NORMAL
BH CV LOWER VASCULAR RIGHT GREATER SAPH BK COMPRESS: NORMAL
BH CV LOWER VASCULAR RIGHT LESSER SAPH COMPRESS: NORMAL
BH CV LOWER VASCULAR RIGHT MID FEMORAL AUGMENT: NORMAL
BH CV LOWER VASCULAR RIGHT MID FEMORAL COMPETENT: NORMAL
BH CV LOWER VASCULAR RIGHT MID FEMORAL COMPRESS: NORMAL
BH CV LOWER VASCULAR RIGHT MID FEMORAL PHASIC: NORMAL
BH CV LOWER VASCULAR RIGHT MID FEMORAL SPONT: NORMAL
BH CV LOWER VASCULAR RIGHT PERONEAL COMPRESS: NORMAL
BH CV LOWER VASCULAR RIGHT POPLITEAL AUGMENT: NORMAL
BH CV LOWER VASCULAR RIGHT POPLITEAL COMPETENT: NORMAL
BH CV LOWER VASCULAR RIGHT POPLITEAL COMPRESS: NORMAL
BH CV LOWER VASCULAR RIGHT POPLITEAL PHASIC: NORMAL
BH CV LOWER VASCULAR RIGHT POPLITEAL SPONT: NORMAL
BH CV LOWER VASCULAR RIGHT POSTERIOR TIBIAL COMPRESS: NORMAL
BH CV LOWER VASCULAR RIGHT PROFUNDA FEMORAL COMPRESS: NORMAL
BH CV LOWER VASCULAR RIGHT PROXIMAL FEMORAL COMPRESS: NORMAL
BH CV LOWER VASCULAR RIGHT SAPHENOFEMORAL JUNCTION COMPRESS: NORMAL
BH CV VAS PRELIMINARY FINDINGS SCRIPTING: 1
BUN SERPL-MCNC: 14 MG/DL (ref 8–23)
BUN/CREAT SERPL: 22.6 (ref 7–25)
CALCIUM SPEC-SCNC: 9.6 MG/DL (ref 8.6–10.5)
CHLORIDE SERPL-SCNC: 106 MMOL/L (ref 98–107)
CO2 SERPL-SCNC: 28.4 MMOL/L (ref 22–29)
CREAT SERPL-MCNC: 0.62 MG/DL (ref 0.57–1)
DEPRECATED RDW RBC AUTO: 43.8 FL (ref 37–54)
EGFRCR SERPLBLD CKD-EPI 2021: 98.4 ML/MIN/1.73
EOSINOPHIL # BLD AUTO: 0.38 10*3/MM3 (ref 0–0.4)
EOSINOPHIL NFR BLD AUTO: 7.5 % (ref 0.3–6.2)
ERYTHROCYTE [DISTWIDTH] IN BLOOD BY AUTOMATED COUNT: 12.4 % (ref 12.3–15.4)
GLUCOSE SERPL-MCNC: 116 MG/DL (ref 65–99)
HCT VFR BLD AUTO: 36.7 % (ref 34–46.6)
HGB BLD-MCNC: 12 G/DL (ref 12–15.9)
IMM GRANULOCYTES # BLD AUTO: 0.03 10*3/MM3 (ref 0–0.05)
IMM GRANULOCYTES NFR BLD AUTO: 0.6 % (ref 0–0.5)
LYMPHOCYTES # BLD AUTO: 1.78 10*3/MM3 (ref 0.7–3.1)
LYMPHOCYTES NFR BLD AUTO: 35.2 % (ref 19.6–45.3)
MCH RBC QN AUTO: 31.6 PG (ref 26.6–33)
MCHC RBC AUTO-ENTMCNC: 32.7 G/DL (ref 31.5–35.7)
MCV RBC AUTO: 96.6 FL (ref 79–97)
MONOCYTES # BLD AUTO: 0.54 10*3/MM3 (ref 0.1–0.9)
MONOCYTES NFR BLD AUTO: 10.7 % (ref 5–12)
NEUTROPHILS NFR BLD AUTO: 2.29 10*3/MM3 (ref 1.7–7)
NEUTROPHILS NFR BLD AUTO: 45.4 % (ref 42.7–76)
NRBC BLD AUTO-RTO: 0 /100 WBC (ref 0–0.2)
PLATELET # BLD AUTO: 268 10*3/MM3 (ref 140–450)
PMV BLD AUTO: 9.7 FL (ref 6–12)
POTASSIUM SERPL-SCNC: 3.7 MMOL/L (ref 3.5–5.2)
RBC # BLD AUTO: 3.8 10*6/MM3 (ref 3.77–5.28)
SODIUM SERPL-SCNC: 144 MMOL/L (ref 136–145)
WBC NRBC COR # BLD AUTO: 5.05 10*3/MM3 (ref 3.4–10.8)

## 2025-06-11 PROCEDURE — 93971 EXTREMITY STUDY: CPT

## 2025-06-11 PROCEDURE — 99285 EMERGENCY DEPT VISIT HI MDM: CPT

## 2025-06-11 PROCEDURE — 85025 COMPLETE CBC W/AUTO DIFF WBC: CPT | Performed by: EMERGENCY MEDICINE

## 2025-06-11 PROCEDURE — 93971 EXTREMITY STUDY: CPT | Performed by: STUDENT IN AN ORGANIZED HEALTH CARE EDUCATION/TRAINING PROGRAM

## 2025-06-11 PROCEDURE — G0378 HOSPITAL OBSERVATION PER HR: HCPCS

## 2025-06-11 PROCEDURE — A9577 INJ MULTIHANCE: HCPCS | Performed by: EMERGENCY MEDICINE

## 2025-06-11 PROCEDURE — 80048 BASIC METABOLIC PNL TOTAL CA: CPT | Performed by: EMERGENCY MEDICINE

## 2025-06-11 PROCEDURE — 73720 MRI LWR EXTREMITY W/O&W/DYE: CPT

## 2025-06-11 PROCEDURE — 25510000002 GADOBENATE DIMEGLUMINE 529 MG/ML SOLUTION: Performed by: EMERGENCY MEDICINE

## 2025-06-11 RX ORDER — ONDANSETRON 4 MG/1
4 TABLET, ORALLY DISINTEGRATING ORAL EVERY 6 HOURS PRN
Status: DISCONTINUED | OUTPATIENT
Start: 2025-06-11 | End: 2025-06-12 | Stop reason: HOSPADM

## 2025-06-11 RX ORDER — BISACODYL 5 MG/1
5 TABLET, DELAYED RELEASE ORAL DAILY PRN
Status: DISCONTINUED | OUTPATIENT
Start: 2025-06-11 | End: 2025-06-12 | Stop reason: HOSPADM

## 2025-06-11 RX ORDER — SODIUM CHLORIDE 0.9 % (FLUSH) 0.9 %
10 SYRINGE (ML) INJECTION AS NEEDED
Status: DISCONTINUED | OUTPATIENT
Start: 2025-06-11 | End: 2025-06-12 | Stop reason: HOSPADM

## 2025-06-11 RX ORDER — HYDROCODONE BITARTRATE AND ACETAMINOPHEN 5; 325 MG/1; MG/1
1 TABLET ORAL EVERY 6 HOURS PRN
Refills: 0 | Status: DISCONTINUED | OUTPATIENT
Start: 2025-06-11 | End: 2025-06-12 | Stop reason: HOSPADM

## 2025-06-11 RX ORDER — BISACODYL 10 MG
10 SUPPOSITORY, RECTAL RECTAL DAILY PRN
Status: DISCONTINUED | OUTPATIENT
Start: 2025-06-11 | End: 2025-06-12 | Stop reason: HOSPADM

## 2025-06-11 RX ORDER — ONDANSETRON 2 MG/ML
4 INJECTION INTRAMUSCULAR; INTRAVENOUS EVERY 6 HOURS PRN
Status: DISCONTINUED | OUTPATIENT
Start: 2025-06-11 | End: 2025-06-12 | Stop reason: HOSPADM

## 2025-06-11 RX ORDER — AMOXICILLIN 250 MG
2 CAPSULE ORAL 2 TIMES DAILY PRN
Status: DISCONTINUED | OUTPATIENT
Start: 2025-06-11 | End: 2025-06-12 | Stop reason: HOSPADM

## 2025-06-11 RX ORDER — POLYETHYLENE GLYCOL 3350 17 G/17G
17 POWDER, FOR SOLUTION ORAL DAILY PRN
Status: DISCONTINUED | OUTPATIENT
Start: 2025-06-11 | End: 2025-06-12 | Stop reason: HOSPADM

## 2025-06-11 RX ORDER — SODIUM CHLORIDE 9 MG/ML
40 INJECTION, SOLUTION INTRAVENOUS AS NEEDED
Status: DISCONTINUED | OUTPATIENT
Start: 2025-06-11 | End: 2025-06-12 | Stop reason: HOSPADM

## 2025-06-11 RX ORDER — CITALOPRAM HYDROBROMIDE 40 MG/1
40 TABLET ORAL DAILY
Status: DISCONTINUED | OUTPATIENT
Start: 2025-06-12 | End: 2025-06-12 | Stop reason: HOSPADM

## 2025-06-11 RX ORDER — SODIUM CHLORIDE 0.9 % (FLUSH) 0.9 %
10 SYRINGE (ML) INJECTION EVERY 12 HOURS SCHEDULED
Status: DISCONTINUED | OUTPATIENT
Start: 2025-06-11 | End: 2025-06-12 | Stop reason: HOSPADM

## 2025-06-11 RX ORDER — ATORVASTATIN CALCIUM 20 MG/1
20 TABLET, FILM COATED ORAL DAILY
Status: DISCONTINUED | OUTPATIENT
Start: 2025-06-12 | End: 2025-06-12 | Stop reason: HOSPADM

## 2025-06-11 RX ORDER — LEVOTHYROXINE SODIUM 25 UG/1
25 TABLET ORAL EVERY MORNING
Status: DISCONTINUED | OUTPATIENT
Start: 2025-06-12 | End: 2025-06-12 | Stop reason: HOSPADM

## 2025-06-11 RX ADMIN — GADOBENATE DIMEGLUMINE 15 ML: 529 INJECTION, SOLUTION INTRAVENOUS at 21:50

## 2025-06-11 RX ADMIN — Medication 10 ML: at 23:01

## 2025-06-11 NOTE — ED NOTES
"Nursing report ED to floor  Estephania Gimenez  64 y.o.  female    HPI :  HPI  Stated Reason for Visit: right calf swelling since friday recent travel  History Obtained From: patient    Chief Complaint  Chief Complaint   Patient presents with    Leg Pain       Admitting doctor:   Evon Maxwell MD    Admitting diagnosis:   The encounter diagnosis was Right leg swelling.    Code status:   Current Code Status       Date Active Code Status Order ID Comments User Context       6/11/2025 1659 CPR (Attempt to Resuscitate) [id number]  Marco José III, PA ED        Question Answer    Code Status (Patient has no pulse and is not breathing) CPR (Attempt to Resuscitate)    Medical Interventions (Patient has pulse or is breathing) Full Support    Level Of Support Discussed With Patient                    Allergies:   Patient has no known allergies.    Isolation:   No active isolations    Intake and Output  No intake or output data in the 24 hours ending 06/11/25 1908    Weight:       06/11/25  1524   Weight: 72.6 kg (160 lb)       Most recent vitals:   Vitals:    06/11/25 1522 06/11/25 1524 06/11/25 1821   BP: (!) 158/111  147/91   BP Location:   Right arm   Patient Position:   Sitting   Pulse: 103  94   Resp: 20  20   Temp: 98.4 °F (36.9 °C)     SpO2: 97%  100%   Weight:  72.6 kg (160 lb)    Height:  165.1 cm (65\")        Active LDAs/IV Access:   Lines, Drains & Airways       Active LDAs       Name Placement date Placement time Site Days    Peripheral IV 06/11/25 1715 20 G Left Antecubital 06/11/25  1715  Antecubital  less than 1                    Labs (abnormal labs have a star):   Labs Reviewed   BASIC METABOLIC PANEL - Abnormal; Notable for the following components:       Result Value    Glucose 116 (*)     All other components within normal limits    Narrative:     GFR Categories in Chronic Kidney Disease (CKD)              GFR Category          GFR (mL/min/1.73)    Interpretation  G1                    90 or " greater        Normal or high (1)  G2                    60-89                Mild decrease (1)  G3a                   45-59                Mild to moderate decrease  G3b                   30-44                Moderate to severe decrease  G4                    15-29                Severe decrease  G5                    14 or less           Kidney failure    (1)In the absence of evidence of kidney disease, neither GFR category G1 or G2 fulfill the criteria for CKD.    eGFR calculation 2021 CKD-EPI creatinine equation, which does not include race as a factor   CBC WITH AUTO DIFFERENTIAL - Abnormal; Notable for the following components:    Eosinophil % 7.5 (*)     Immature Grans % 0.6 (*)     All other components within normal limits   CBC AND DIFFERENTIAL    Narrative:     The following orders were created for panel order CBC & Differential.  Procedure                               Abnormality         Status                     ---------                               -----------         ------                     CBC Auto Differential[[id number]]        Abnormal            Final result                 Please view results for these tests on the individual orders.       EKG:   No orders to display       Meds given in ED:   Medications   sodium chloride 0.9 % flush 10 mL (has no administration in time range)   sodium chloride 0.9 % flush 10 mL (has no administration in time range)   sodium chloride 0.9 % flush 10 mL (has no administration in time range)   sodium chloride 0.9 % infusion 40 mL (has no administration in time range)   ondansetron ODT (ZOFRAN-ODT) disintegrating tablet 4 mg (has no administration in time range)     Or   ondansetron (ZOFRAN) injection 4 mg (has no administration in time range)   HYDROcodone-acetaminophen (NORCO) 5-325 MG per tablet 1 tablet (has no administration in time range)   sennosides-docusate (PERICOLACE) 8.6-50 MG per tablet 2 tablet (has no administration in time range)     And    polyethylene glycol (MIRALAX) packet 17 g (has no administration in time range)     And   bisacodyl (DULCOLAX) EC tablet 5 mg (has no administration in time range)     And   bisacodyl (DULCOLAX) suppository 10 mg (has no administration in time range)       Imaging results:  No radiology results for the last day    Ambulatory status:   - independent    Social issues:   Social History     Socioeconomic History    Marital status:    Tobacco Use    Smoking status: Never     Passive exposure: Never    Smokeless tobacco: Never   Vaping Use    Vaping status: Never Used   Substance and Sexual Activity    Alcohol use: Yes     Alcohol/week: 6.0 standard drinks of alcohol     Types: 6 Glasses of wine per week    Drug use: No    Sexual activity: Yes     Partners: Male     Birth control/protection: Surgical       Peripheral Neurovascular  Peripheral Neurovascular (Adult)  Peripheral Neurovascular WDL: .WDL except, neurovascular assessment lower, pulse assessment  Pulse Assessment: dorsalis pedis  Additional Documentation: Edema (Group)  Edema  Edema: (S) foot, right, ankle, right, leg, right  Leg, Right Edema: 1+ (Trace)  Ankle, Right Edema: 1+ (Trace)  Foot, Right Edema: 1+ (Trace)  LLE Neurovascular Assessment  Temperature LLE: warm  Color LLE: no discoloration  RLE Neurovascular Assessment  Temperature RLE: warm (right lower calf is swollen, tight, and painful, pulses intact, no discoloration, not hot to the touch)  Color RLE: no discoloration  Sensation RLE: (S) tenderness present    Neuro Cognitive  Neuro Cognitive (Adult)  Cognitive/Neuro/Behavioral WDL: WDL, speech, orientation  Orientation: oriented x 4  Speech: clear  Additional Documentation: [place] Coma Scale (Group)  Julio Coma Scale  Best Eye Response: 4-->(E4) spontaneous  Best Motor Response: 6-->(M6) obeys commands  Best Verbal Response: 5-->(V5) oriented  Julio Coma Scale Score: 15    Learning  Learning Assessment  Learning Readiness and Ability:  no barriers identified  Education Provided  Person Taught: patient    Respiratory  Respiratory  Airway WDL: WDL  Respiratory WDL  Respiratory WDL: WDL, all  Rhythm/Pattern, Respiratory: no shortness of breath reported, depth regular, pattern regular, unlabored    Abdominal Pain       Pain Assessments  Pain (Adult)  (0-10) Pain Rating: Rest: 4  (0-10) Pain Rating: Activity: 4  Pain Location: calf  Pain Side/Orientation: right    NIH Stroke Scale       Romel Bnaegas RN  06/11/25 19:08 EDT

## 2025-06-11 NOTE — ED PROVIDER NOTES
EMERGENCY DEPARTMENT ENCOUNTER    Room Number:  S04/04  PCP: Mel Bailon APRN    HPI:  Chief Complaint: Right leg swelling  A complete HPI/ROS/PMH/PSH/SH/FH are unobtainable due to: None  Context: Estephania Gimenez is a 64 y.o. female who presents to the ED c/o acute right leg swelling.  She states that she is been traveling recently first to [place] and then over the past weekend she went to [facility].  On Friday she started noticing pain and swelling to her right calf.  Has been persistent.  Pain is mild.  No fever.        PAST MEDICAL HISTORY  Active Ambulatory Problems     Diagnosis Date Noted    Anxiety 03/09/2016    Hyperlipidemia 03/09/2016    Encounter for gynecological examination without abnormal finding 03/10/2017    Environmental and seasonal allergies 04/18/2017    Hyperglycemia 11/13/2018    Exertional headache 05/14/2019    Neck pain, acute 09/30/2020    TSH elevation 02/09/2021    Post-menopausal 08/24/2022    Subclinical hypothyroidism 06/07/2024    Osteopenia 06/07/2024    Leukopenia 06/07/2024    Vitamin D deficiency 06/07/2024     Resolved Ambulatory Problems     Diagnosis Date Noted    Olecranon bursitis of left elbow 07/08/2016     Past Medical History:   Diagnosis Date    Cellulitis     Disease of thyroid gland          PAST SURGICAL HISTORY  Past Surgical History:   Procedure Laterality Date    COLONOSCOPY      TUBAL ABDOMINAL LIGATION      WISDOM TOOTH EXTRACTION           FAMILY HISTORY  Family History   Problem Relation Age of Onset    COPD Mother     No Known Problems Father     Cancer Maternal Aunt     Breast cancer Maternal Aunt         over 60    Breast cancer Paternal Aunt         over 60    Throat cancer Paternal Uncle     Heart disease Maternal Grandmother     Colon cancer Paternal Uncle     Heart disease Paternal Uncle     Heart disease Paternal Uncle     Ovarian cancer Neg Hx     Uterine cancer Neg Hx     Deep vein thrombosis Neg Hx     Pulmonary embolism Neg Hx           SOCIAL HISTORY  Social History     Socioeconomic History    Marital status:    Tobacco Use    Smoking status: Never     Passive exposure: Never    Smokeless tobacco: Never   Vaping Use    Vaping status: Never Used   Substance and Sexual Activity    Alcohol use: Yes     Alcohol/week: 6.0 standard drinks of alcohol     Types: 6 Glasses of wine per week    Drug use: No    Sexual activity: Yes     Partners: Male     Birth control/protection: Surgical         ALLERGIES  Patient has no known allergies.        REVIEW OF SYSTEMS  Review of Systems     Included in HPI  All systems reviewed and negative except for those discussed in HPI.       PHYSICAL EXAM  ED Triage Vitals [06/11/25 1522]   Temp Heart Rate Resp BP SpO2   98.4 °F (36.9 °C) 103 20 (!) 158/111 97 %      Temp src Heart Rate Source Patient Position BP Location FiO2 (%)   -- -- -- -- --       Physical Exam      GENERAL: no acute distress  HENT: nares patent  EYES: no scleral icterus  CV: regular rhythm, normal rate, 1+ right DP pulse with good capillary refill  RESPIRATORY: normal effort  ABDOMEN: soft  MUSCULOSKELETAL: no deformity, prominent swelling to the right calf, no overlying redness or warmth, 2+ edema to the right lower leg  NEURO: alert, moves all extremities, follows commands  PSYCH:  calm, cooperative  SKIN: warm, dry    Vital signs and nursing notes reviewed.          LAB RESULTS  Recent Results (from the past 24 hours)   Duplex Venous Lower Extremity - Right CAR    Collection Time: 06/11/25  4:01 PM   Result Value Ref Range    Right Common Femoral Spont Y     Right Common Femoral Competent Y     Right Common Femoral Phasic Y     Right Common Femoral Compress C     Right Common Femoral Augment Y     Right Saphenofemoral Junction Compress C     Right Profunda Femoral Compress C     Right Proximal Femoral Compress C     Right Mid Femoral Spont Y     Right Mid Femoral Competent Y     Right Mid Femoral Phasic Y     Right Mid Femoral  Compress C     Right Mid Femoral Augment Y     Right Distal Femoral Compress C     Right Popliteal Spont Y     Right Popliteal Competent Y     Right Popliteal Phasic Y     Right Popliteal Compress C     Right Popliteal Augment Y     Right Posterior Tibial Compress C     Right Peroneal Compress C     Right Gastronemius Compress C     Right Greater Saph AK Compress C     Right Greater Saph BK Compress C     Right Lesser Saph Compress C     Left Common Femoral Spont Y     Left Common Femoral Competent Y     Left Common Femoral Phasic Y     Left Common Femoral Compress C     Left Common Femoral Augment Y     BH CV VAS PRELIMINARY FINDINGS SCRIPTING 1.0        Ordered the above labs and reviewed the results.        RADIOLOGY  Duplex Venous Lower Extremity - Right CAR  Result Date: 6/11/2025    No deep or superficial vein thrombosis.   Heterogeneous mass in medial calf 13.4 x 4.2 x 1.2 cm in size.       Ordered the above noted radiological studies. Reviewed by me in PACS.        MEDICATIONS GIVEN IN ER  Medications   sodium chloride 0.9 % flush 10 mL (has no administration in time range)         ORDERS PLACED DURING THIS VISIT:  Orders Placed This Encounter   Procedures    Basic Metabolic Panel    CBC Auto Differential    Insert Peripheral IV    CBC & Differential         OUTPATIENT MEDICATION MANAGEMENT:  Current Facility-Administered Medications Ordered in Epic   Medication Dose Route Frequency Provider Last Rate Last Admin    sodium chloride 0.9 % flush 10 mL  10 mL Intravenous Sukhjinder Messer II, MD         Current Outpatient Medications Ordered in Epic   Medication Sig Dispense Refill    atorvastatin (LIPITOR) 20 MG tablet Take 1 tablet by mouth Daily. 90 tablet 3    Calcium Citrate-Vitamin D (CALCIUM CITRATE + PO) Take  by mouth.      citalopram (CeleXA) 40 MG tablet Take 1 tablet by mouth Daily. 90 tablet 3    levothyroxine (SYNTHROID, LEVOTHROID) 25 MCG tablet Take 1 tablet by mouth Every Morning. 90  tablet 3    MULTIPLE VITAMINS-CALCIUM PO Take  by mouth.         PROCEDURES  Procedures          MEDICAL DECISION MAKING, PROGRESS, and CONSULTS    Discussion below represents my analysis of pertinent findings related to patient's condition, differential diagnosis, treatment plan and final disposition.          Differential diagnosis:    DVT, hematoma, abscess, malignancy               Independent interpretation of labs, radiology studies, and discussions with consultants:  ED Course as of 06/11/25 1658   Wed Jun 11, 2025   1622 Ultrasound negative for DVT.  Heterogeneous mass in the medial calf. [TD]      ED Course User Index  [TD] Sukhjinder Malik II, MD         I discussed the case with MEENA Llanos.  I will admit for MRI to better evaluate this mass in her right lower extremity.  At this time, I do not have concern for compartment syndrome as her compartments are soft and has good distal pulses and sensation distally.        Clinical Scores:                                   DIAGNOSIS  Final diagnoses:   Right leg swelling         DISPOSITION  Admit      Latest Documented Vital Signs:  As of 16:58 EDT  BP- (!) 158/111 HR- 103 Temp- 98.4 °F (36.9 °C) O2 sat- 97%      --    Please note that portions of this were completed with a voice recognition program.       Note Disclaimer: At [facility], we believe that sharing information builds trust and better relationships. You are receiving this note because you are receiving care at [facility] or recently visited. It is possible you will see health information before a provider has talked with you about it. This kind of information can be easy to misunderstand. To help you fully understand what it means for your health, we urge you to discuss this note with your provider.         Sukhjinder Malik II, MD  06/11/25 [id number]

## 2025-06-11 NOTE — PROGRESS NOTES
Clinical Pharmacy Services: Medication History    Estephania Gimenez is a 64 y.o. female presenting to [facility] for   Chief Complaint   Patient presents with    Leg Pain       She  has a past medical history of Anxiety, Cellulitis, Disease of thyroid gland, and Hyperlipidemia.    Allergies as of 06/11/2025    (No Known Allergies)       Medication information was obtained from: Patient   Pharmacy and Phone Number:     Prior to Admission Medications       Prescriptions Last Dose Informant Patient Reported? Taking?    atorvastatin (LIPITOR) 20 MG tablet  Self No Yes    Take 1 tablet by mouth Daily.    Calcium Citrate-Vitamin D (CALCIUM CITRATE + PO)  Self Yes Yes    Take 1 tablet by mouth Daily.    citalopram (CeleXA) 40 MG tablet  Self No Yes    Take 1 tablet by mouth Daily.    levothyroxine (SYNTHROID, LEVOTHROID) 25 MCG tablet  Self No Yes    Take 1 tablet by mouth Every Morning.    Patient taking differently:  Take 1 tablet by mouth Every Morning.    MULTIPLE VITAMINS-CALCIUM PO  Self Yes Yes    Take 1 tablet by mouth Daily.              Medication notes:     This medication list is complete to the best of my knowledge as of 6/11/2025    Please call if questions.    Vandana Correa  Medication History Technician   [phone]    6/11/2025 19:03 EDT

## 2025-06-12 ENCOUNTER — READMISSION MANAGEMENT (OUTPATIENT)
Dept: CALL CENTER | Facility: HOSPITAL | Age: 65
End: 2025-06-12
Payer: COMMERCIAL

## 2025-06-12 VITALS
HEIGHT: 66 IN | OXYGEN SATURATION: 96 % | HEART RATE: 76 BPM | BODY MASS INDEX: 25.71 KG/M2 | RESPIRATION RATE: 18 BRPM | DIASTOLIC BLOOD PRESSURE: 92 MMHG | WEIGHT: 160 LBS | SYSTOLIC BLOOD PRESSURE: 127 MMHG | TEMPERATURE: 97.7 F

## 2025-06-12 LAB
ANION GAP SERPL CALCULATED.3IONS-SCNC: 7.8 MMOL/L (ref 5–15)
BUN SERPL-MCNC: 14 MG/DL (ref 8–23)
BUN/CREAT SERPL: 25.9 (ref 7–25)
CALCIUM SPEC-SCNC: 9.6 MG/DL (ref 8.6–10.5)
CHLORIDE SERPL-SCNC: 106 MMOL/L (ref 98–107)
CO2 SERPL-SCNC: 29.2 MMOL/L (ref 22–29)
CREAT SERPL-MCNC: 0.54 MG/DL (ref 0.57–1)
DEPRECATED RDW RBC AUTO: 43.2 FL (ref 37–54)
EGFRCR SERPLBLD CKD-EPI 2021: 101.7 ML/MIN/1.73
ERYTHROCYTE [DISTWIDTH] IN BLOOD BY AUTOMATED COUNT: 12.6 % (ref 12.3–15.4)
GLUCOSE SERPL-MCNC: 127 MG/DL (ref 65–99)
HCT VFR BLD AUTO: 36.8 % (ref 34–46.6)
HGB BLD-MCNC: 12.3 G/DL (ref 12–15.9)
MCH RBC QN AUTO: 31.8 PG (ref 26.6–33)
MCHC RBC AUTO-ENTMCNC: 33.4 G/DL (ref 31.5–35.7)
MCV RBC AUTO: 95.1 FL (ref 79–97)
PLATELET # BLD AUTO: 270 10*3/MM3 (ref 140–450)
PMV BLD AUTO: 9.8 FL (ref 6–12)
POTASSIUM SERPL-SCNC: 3.7 MMOL/L (ref 3.5–5.2)
RBC # BLD AUTO: 3.87 10*6/MM3 (ref 3.77–5.28)
SODIUM SERPL-SCNC: 143 MMOL/L (ref 136–145)
WBC NRBC COR # BLD AUTO: 4.22 10*3/MM3 (ref 3.4–10.8)

## 2025-06-12 PROCEDURE — 85027 COMPLETE CBC AUTOMATED: CPT | Performed by: PHYSICIAN ASSISTANT

## 2025-06-12 PROCEDURE — G0378 HOSPITAL OBSERVATION PER HR: HCPCS

## 2025-06-12 PROCEDURE — 80048 BASIC METABOLIC PNL TOTAL CA: CPT | Performed by: PHYSICIAN ASSISTANT

## 2025-06-12 RX ADMIN — LEVOTHYROXINE SODIUM 25 MCG: 0.03 TABLET ORAL at 07:26

## 2025-06-12 NOTE — PLAN OF CARE
Goal Outcome Evaluation:            Patient came in for right leg swelling. Patient is alert and oriented. Vital signs are stable. Ultrasound ruled out DVT. MRI results are still pending.

## 2025-06-12 NOTE — H&P
[facility]   HISTORY AND PHYSICAL    Patient Name: Estephania Gimenez  : 1960  MRN: [id number]  Primary Care Physician:  Mel Bailon APRN  Date of admission: 2025    Subjective   Subjective     Chief Complaint:   Chief Complaint   Patient presents with    Leg Pain         HPI:    Estephania Gimenez is a 64 y.o. female medical history significant for anxiety, hypothyroidism, and hyperlipidemia who presented to the emergency department with complaint of right lower extremity swelling and was admitted to the ED observation unit for further evaluation and workup.      Patient states she flew to [place] on Friday,  and noticed some swelling of RLE that progressively worsened. States she had pain to RLE with ambulation. Patient had an appointment with her skin doctor today who recommended she have a medical evaluation immediately to rule out a blood clot.     ED work-up: CBC and CMP grossly unremarkable. Venous Doppler of RLE was negative for DVT but did report an avascular, complex fluid collection in the right medial calf from proximal to mid distal measuring approximately 13.4L x 4.19W x 1.22H cm. MRI of Right Tib/Fib with and without contrast is pending.    On admission to the ED Observation Unit, patient is alert & oriented X4. Currently denies any complaint of pain. RLE swelling of lower leg, ankle, and foot. Bruising noted to medical aspect of right great toe. Unable to palpate a pedal pulse but pulse was dopplered. Denies any pain to RLE, but states she has pain when bearing weight.      Review of Systems   All systems were reviewed and negative except for: As documented in HPI.    Personal History     Past Medical History:   Diagnosis Date    Anxiety     Cellulitis     Disease of thyroid gland     Hyperlipidemia        Past Surgical History:   Procedure Laterality Date    COLONOSCOPY      TUBAL ABDOMINAL LIGATION      WISDOM TOOTH EXTRACTION         Family History: family history  includes Breast cancer in her maternal aunt and paternal aunt; COPD in her mother; Cancer in her maternal aunt; Colon cancer in her paternal uncle; Heart disease in her maternal grandmother, paternal uncle, and paternal uncle; No Known Problems in her father; Throat cancer in her paternal uncle. Otherwise pertinent FHx was reviewed and not pertinent to current issue.    Social History:  reports that she has never smoked. She has never been exposed to tobacco smoke. She has never used smokeless tobacco. She reports current alcohol use of about 6.0 standard drinks of alcohol per week. She reports that she does not use drugs.    Home Medications:  Calcium Citrate-Vitamin D, Multiple Vitamins-Calcium, atorvastatin, citalopram, and levothyroxine    Allergies:  No Known Allergies    Objective   Objective     Vitals:   Temp:  [98.4 °F (36.9 °C)] 98.4 °F (36.9 °C)  Heart Rate:  [] 94  Resp:  [20] 20  BP: (147-158)/() 147/91  Physical Exam    Constitutional: Awake, alert   Eyes: PERRLA, sclerae anicteric, no conjunctival injection   HENT: NCAT, mucous membranes moist   Neck: Supple, no thyromegaly, no lymphadenopathy, trachea midline   Respiratory: Nonlabored respirations    Cardiovascular: Regular rate and rhythm, Right pedal pulse dopplered   Gastrointestinal:Nontender, nondistended   Musculoskeletal: Right lower leg, ankle, foot edema   Psychiatric: Appropriate affect, cooperative  Neurologic: Oriented x 3, strength symmetric in all extremities, Cranial Nerves grossly intact to confrontation, speech clear   Skin: No rashes     Result Review    Result Review:  I have personally reviewed the results from the time of this admission to 6/11/2025 22:49 EDT and agree with these findings:  [x]  Laboratory list / accordion  []  Microbiology  [x]  Radiology  []  EKG/Telemetry   []  Cardiology/Vascular   []  Pathology  []  Old records  []  Other:  Most notable findings include: Venous Doppler of RLE was negative for DVT  but did report an avascular, complex fluid collection in the right medial calf from proximal to mid distal measuring approximately 13.4L x 4.19W x 1.22H cm.      Assessment & Plan   The 10-year ASCVD risk score (Oswaldo RODRIGUEZ, et al., 2019) is: 8.2%    Values used to calculate the score:      Age: 64 years      Sex: Female      Is Non- : No      Diabetic: No      Tobacco smoker: No      Systolic Blood Pressure: 147 mmHg      Is BP treated: No      HDL Cholesterol: 36 mg/dL      Total Cholesterol: 148 mg/dL    Assessment / Plan     Brief Patient Summary:  Estephania Gimenez is a 64 y.o. female who was admitted to the ED observation unit for further evaluation and workup with right lower extremity edema.  Venous Doppler of the right lower extremity was negative for DVT however, it did report an avascular, complex fluid collection in the right medial calf from proximal to mid distal measuring approximately 13.4L x 4.19W x 1.22H cm.  MRI of the right tib-fib with and without contrast result is pending.       Active Hospital Problems:  Active Hospital Problems    Diagnosis     **Hematoma of lower extremity      Plan:     Right lower extremity edema  -Labs grossly unremarkable  -Venous Doppler was negative for DVT  -Venous Doppler did report an avascular, complex fluid collection in the right medial calf from proximal to mid distal measuring approximately 13.4L x 4.19W x 1.22H cm.  -MRI of the right tib-fib with and without contrast result is pending  -Consider consults depending on MRI results  - Check right lower extremity pulses every 4 hours    VTE Prophylaxis:  Mechanical VTE prophylaxis orders are present.      CODE STATUS:    Code Status (Patient has no pulse and is not breathing): CPR (Attempt to Resuscitate)  Medical Interventions (Patient has pulse or is breathing): Full Support  Level Of Support Discussed With: Patient    Admission Status:  I believe this patient meets observation  status.    Electronically signed by SB Dewey, 06/11/25, 10:49 PM EDT.        75 minutes has been spent by [facility] providers in the care of this patient while under observation status on this date 06/11/25        I have worn appropriate PPE during this patient encounter, sanitized my hands both with entering and exiting patient's room.    I have discussed plan of care with patient including advance care plan and/or surrogate decision maker.  Patient advises that their  will be their primary surrogate decision maker

## 2025-06-12 NOTE — OUTREACH NOTE
Prep Survey      Flowsheet Row Responses   [facility] patient discharged from? [place]   Is LACE score < 7 ? Yes   Eligibility [facility]   Date of Admission 06/11/25   Date of Discharge 06/12/25   Discharge Disposition Home or Self Care   Discharge diagnosis partial tear of the medial aspect of the distal medial head gastrocnemius tendon at the  gastrocnemius and soleus junction which may be a cause for this hematoma   Does the patient have one of the following disease processes/diagnoses(primary or secondary)? Other   Does the patient have Home health ordered? No   Is there a DME ordered? No   Prep survey completed? Yes            CHANDLER CAMPA - Registered Nurse

## 2025-06-12 NOTE — DISCHARGE SUMMARY
ED OBSERVATION PROGRESS/DISCHARGE SUMMARY    Date of Admission: 6/11/2025   LOS: 0 days   PCP: Mel Bailon APRN    Final Diagnosis: partial tear of the medial aspect of the distal medial head gastrocnemius tendon at the  gastrocnemius and soleus junction which may be a cause for this hematoma      Subjective     Hospital Outcome:   Estephania Gimenez is a 64 y.o. female medical history significant for anxiety, hypothyroidism, and hyperlipidemia who presented to the emergency department with complaint of right lower extremity swelling and was admitted to the ED observation unit for further evaluation and workup.       Patient states she flew to [place] on Friday, June 6 and noticed some swelling of RLE that progressively worsened. States she had pain to RLE with ambulation. Patient had an appointment with her skin doctor today who recommended she have a medical evaluation immediately to rule out a blood clot.      ED work-up: CBC and CMP grossly unremarkable. Venous Doppler of RLE was negative for DVT but did report an avascular, complex fluid collection in the right medial calf from proximal to mid distal measuring approximately 13.4L x 4.19W x 1.22H cm. MRI of Right Tib/Fib with and without contrast is pending.     On admission to the ED Observation Unit, patient is alert & oriented X4. Currently denies any complaint of pain. RLE swelling of lower leg, ankle, and foot. Bruising noted to medical aspect of right great toe. Unable to palpate a pedal pulse but pulse was dopplered. Denies any pain to RLE, but states she has pain when bearing weight.    6/12/25: seen and examined, resting in bed, working on laptop, no acute distress. RLE swelling appreciated, pulses palpable, skin warm to touch. MRI fluid collection/potential hematoma centered between the medial head gastrocnemius and soleus muscles measuring 4.9 x 1.7 x 14.7 cm. There is a partial tear of the medial aspect of the distal medial head gastrocnemius  tendon at the  gastrocnemius and soleus junction which may be a cause for this hematoma. Caudal extension of a Baker's cyst is possible. Generalized edema and swelling of the subcutaneous fat about the ankle. There is no finding specific for infection though infected collection could be considered in the proper clinical setting. There is no fracture or osteomyelitis.  Reviewed MRI findings with patient all questions answered.    ROS:  General: no fevers, chills  Respiratory: no cough, dyspnea  Cardiovascular: no chest pain, palpitations  Abdomen: No abdominal pain, nausea, vomiting, or diarrhea  Neurologic: No focal weakness    Objective   Physical Exam:  I have reviewed the vital signs.  Temp:  [97.7 °F (36.5 °C)-98.4 °F (36.9 °C)] 97.7 °F (36.5 °C)  Heart Rate:  [] 76  Resp:  [18-20] 18  BP: (113-158)/() 127/92  General Appearance:    Alert, cooperative, no distress  Head:    Normocephalic, atraumatic  Eyes:    Sclerae anicteric  Neck:   Supple, no mass  Lungs: Clear to auscultation bilaterally, respirations unlabored  Heart: Regular rate and rhythm, S1 and S2 normal, no murmur, rub or gallop  Abdomen:  Soft, nontender, bowel sounds active, nondistended  Extremities: RLE extremities swelling to ankle and calf area  Pulses:  2+ and symmetric in distal lower extremities  Skin: No rashes   Neurologic: Oriented x3, Normal strength to extremities    Results Review:    I have reviewed the labs, radiology results and diagnostic studies.    Results from last 7 days   Lab Units 06/12/25  0408   WBC 10*3/mm3 4.22   HEMOGLOBIN g/dL 12.3   HEMATOCRIT % 36.8   PLATELETS 10*3/mm3 270     Results from last 7 days   Lab Units 06/12/25  0408 06/11/25  1718   SODIUM mmol/L 143 144   POTASSIUM mmol/L 3.7 3.7   CHLORIDE mmol/L 106 106   CO2 mmol/L 29.2* 28.4   BUN mg/dL 14.0 14.0   CREATININE mg/dL 0.54* 0.62   CALCIUM mg/dL 9.6 9.6   GLUCOSE mg/dL 127* 116*     Imaging Results (Last 24 Hours)       Procedure Component  Value Units Date/Time    MRI Tibia Fibula Right With & Without Contrast [[id number]] Collected: 06/12/25 0741     Updated: 06/12/25 0856    Narrative:      MRI RIGHT TIBIA/FIBULA WITH AND WITHOUT CONTRAST     HISTORY: Right leg swelling since 06/06/2025.      TECHNIQUE: MRI right tibia/fibula includes axial T1, T2 fat-sat, T1  fat-sat, coronal T1, STIR, sagittal PD fat saturated sequences.     COMPARISON: None.     FINDINGS: There is a posteromedial calf collection centered between the  medial head gastrocnemius and soleus muscles exhibiting T1 signal that  is isointense to muscle, T2 hyperintense signal. There is peripheral  enhancement without central or nodular enhancement. This collection  measures 4.9 x 1.7 x 14.7 cm and begins at horizontal level 2.8 cm below  the knee joint line. This is consistent with a hematoma. The distal  medial head gastrocnemius fibers with gastrocnemius and soleus junction  appear discontinuous consistent with a partial tear. There is  surrounding soft tissue edema. No internal air or sinus tract formation.  Subcutaneous fat about the ankle.     Right knee joint effusion with small Baker's cyst. Tibial and fibular  bone marrow signal is within normal limits.       Impression:      Fluid collection/potential hematoma centered between the  medial head gastrocnemius and soleus muscles measuring 4.9 x 1.7 x 14.7  cm. There is a partial tear of the medial aspect of the distal medial  head gastrocnemius tendon at the gastrocnemius and soleus junction which  may be a cause for this hematoma. Caudal extension of a Baker's cyst is  possible. Generalized edema and swelling of the subcutaneous fat about  the ankle. There is no finding specific for infection though infected  collection could be considered in the proper clinical setting. There is  no fracture or osteomyelitis.     This report was finalized on 6/12/2025 8:53 AM by Cliff Cerrato M.D  on Workstation: [id number]               I  have reviewed the medications.     Discharge Medications        ASK your doctor about these medications        Instructions Start Date   atorvastatin 20 MG tablet  Commonly known as: LIPITOR   20 mg, Oral, Daily      CALCIUM CITRATE + PO   1 tablet, Daily      citalopram 40 MG tablet  Commonly known as: CeleXA   40 mg, Oral, Daily      levothyroxine 25 MCG tablet  Commonly known as: SYNTHROID, LEVOTHROID   25 mcg, Oral, Every Early Morning      MULTIPLE VITAMINS-CALCIUM PO   1 tablet, Daily              ---------------------------------------------------------------------------------------------  Assessment & Plan   Assessment/Problem List    Hematoma of lower extremity      Plan:  Right lower extremity edema  -Labs grossly unremarkable  -Venous Doppler was negative for DVT  -Venous Doppler did report an avascular, complex fluid collection in the right medial calf from proximal to mid distal measuring approximately 13.4L x 4.19W x 1.22H cm.  -MRI of the right tib-fib with and without contrast result fluid collection/potential hematoma centered between the medial head gastrocnemius and soleus muscles measuring 4.9 x 1.7 x 14.7 cm. There is a partial tear of the medial aspect of the distal medial head gastrocnemius tendon at the  gastrocnemius and soleus junction which may be a cause for this hematoma. Caudal extension of a Baker's cyst is possible. Generalized edema and swelling of the subcutaneous fat about the ankle. There is no finding specific for infection though infected collection could be considered in the proper clinical setting. There is no fracture or osteomyelitis. Discussed findings with patient, no clinical signs of infection appreciated on exam, remains afebrile, doubt infectious.   - Ortho follow up on discharge.   - Denies needing anything for pain control on discharge       VTE Prophylaxis:  Mechanical VTE prophylaxis orders are present.    Disposition: Home    Follow-up after Discharge: Ortho,  PCP    This note will serve as a discharge summary    SB Brewer 06/12/25 09:38 EDT    I have worn appropriate PPE during this patient encounter, sanitized my hands both with entering and exiting patient's room.      38 minutes has been spent by [facility] providers in the care of this patient while under observation status on this date 06/12/25

## 2025-06-12 NOTE — SIGNIFICANT NOTE
06/12/25 0818   OTHER   Discipline physical therapist   Therapy Assessment/Plan (PT)   Criteria for Skilled Interventions Met (PT) no problems identified which require skilled intervention  (Per notes, pt's pain is improved and pt is up adlib/indep.  No indication for acute PT at this time.  PT will s/o.)

## 2025-06-12 NOTE — CASE MANAGEMENT/SOCIAL WORK
Case Management Discharge Note      Final Note: Home via private vehicle         Selected Continued Care - Discharged on 6/12/2025 Admission date: 6/11/2025 - Discharge disposition: Home or Self Care      Destination    No services have been selected for the patient.                Durable Medical Equipment    No services have been selected for the patient.                Dialysis/Infusion    No services have been selected for the patient.                Home Medical Care    No services have been selected for the patient.                Therapy    No services have been selected for the patient.                Community Resources    No services have been selected for the patient.                Community & DME    No services have been selected for the patient.                    Transportation Services  Private: Car    Final Discharge Disposition Code: 01 - home or self-care

## 2025-06-12 NOTE — PROGRESS NOTES
Pt admitted to the observation unit from the emergency department with concerns for right lower leg pain and swelling after traveling in [place] and doing a lot of walking.  Was DVT negative in the ultrasound obtained from ER.  Plan for MRI of the lower leg out of concern for possible malignancy.  Today, swelling is improved, pain is improved.     On exam,   General: No acute distress, nontoxic  HEENT: EOMI  Pulm: Symmetric chest rise, nonlabored breathing  CV: Regular rate and rhythm, minimal edema in the right lower extremity worse in the feet and ankles as compared to the left without erythema, no significant tenderness to palpation  GI: Nondistended  MSK: No deformity  Skin: Warm, dry  Neuro: Awake, alert, oriented x 4, moving all extremities, no focal deficits  Psych: Calm, cooperative    Vital signs and nursing notes reviewed.           Plan: DVT ultrasound negative, labs are reassuring, MRI of the lower leg shows fluid collection/potential hematoma between the medial gastroc and soleus muscles with a partial tear of the medial distal gastroc tendon.  Caudal extension of a Baker's cyst is possible.  Will recommend outpatient supportive care and orthopedic follow-up.  Safe for discharge today.         MD Attestation Note    SHARED VISIT: This visit was performed by BOTH a physician and an APC. The substantive portion of the medical decision making was performed by this attesting physician who made or approved the management plan and takes responsibility for patient management. All studies in the APC note (if performed) were independently interpreted by me.

## 2025-06-12 NOTE — DISCHARGE INSTRUCTIONS
partial tear of the medial aspect of the distal medial head gastrocnemius tendon at the  gastrocnemius and soleus junction which may be a cause for this hematoma

## 2025-06-13 ENCOUNTER — TRANSITIONAL CARE MANAGEMENT TELEPHONE ENCOUNTER (OUTPATIENT)
Dept: CALL CENTER | Facility: HOSPITAL | Age: 65
End: 2025-06-13
Payer: COMMERCIAL

## 2025-06-13 NOTE — OUTREACH NOTE
Call Center TCM Note      Flowsheet Row Responses   [facility] patient discharged from? [place]   Does the patient have one of the following disease processes/diagnoses(primary or secondary)? Other   TCM attempt successful? Yes   Call start time 1619   Call end time 1621   Person spoke with today (if not patient) and relationship Patient   Meds reviewed with patient/caregiver? Yes   Is the patient having any side effects they believe may be caused by any medication additions or changes? No   Does the patient have all medications ordered at discharge? Yes   Is the patient taking all medications as directed (includes completed medication regime)? Yes   Comments Patient is awaiting call for appt info   Does the patient have an appointment with their PCP within 7-14 days of discharge? No   Nursing Interventions Patient desires to follow up with specialty only   Psychosocial issues? No   Did the patient receive a copy of their discharge instructions? Yes   What is the patient's perception of their health status since discharge? Improving   Is the patient/caregiver able to teach back signs and symptoms related to disease process for when to call PCP? Yes   Is the patient/caregiver able to teach back signs and symptoms related to disease process for when to call 911? Yes   Is the patient/caregiver able to teach back the hierarchy of who to call/visit for symptoms/problems? PCP, Specialist, Home health nurse, Urgent Care, ED, 911 Yes   If the patient is a current smoker, are they able to teach back resources for cessation? Not a smoker   TCM call completed? Yes   Wrap up additional comments Quick call, Denies needs.   Call end time 1621   Would this patient benefit from a Referral to Amb Social Work? No   Is the patient interested in additional calls from an ambulatory ? No            Ever PETERSEN - Registered Nurse    6/13/2025, 16:22 EDT

## 2025-06-13 NOTE — OUTREACH NOTE
Call Center TCM Note      Flowsheet Row Responses   [facility] patient discharged from? [place]   Does the patient have one of the following disease processes/diagnoses(primary or secondary)? Other   TCM attempt successful? No   Unsuccessful attempts Attempt 1   Call Status Left message            Ever Eaton Registered Nurse    6/13/2025, 15:43 EDT

## 2025-06-17 ENCOUNTER — APPOINTMENT (OUTPATIENT)
Dept: WOMENS IMAGING | Facility: HOSPITAL | Age: 65
End: 2025-06-17
Payer: COMMERCIAL

## 2025-06-17 PROCEDURE — 77063 BREAST TOMOSYNTHESIS BI: CPT | Performed by: RADIOLOGY

## 2025-06-17 PROCEDURE — 77067 SCR MAMMO BI INCL CAD: CPT | Performed by: RADIOLOGY
